# Patient Record
Sex: MALE | Race: WHITE | Employment: OTHER | URBAN - METROPOLITAN AREA
[De-identification: names, ages, dates, MRNs, and addresses within clinical notes are randomized per-mention and may not be internally consistent; named-entity substitution may affect disease eponyms.]

---

## 2022-07-29 ENCOUNTER — TRANSITIONAL CARE UNIT VISIT (OUTPATIENT)
Dept: GERIATRICS | Facility: CLINIC | Age: 87
End: 2022-07-29
Payer: COMMERCIAL

## 2022-07-29 VITALS
RESPIRATION RATE: 18 BRPM | BODY MASS INDEX: 25.67 KG/M2 | OXYGEN SATURATION: 97 % | HEIGHT: 68 IN | HEART RATE: 58 BPM | TEMPERATURE: 97.8 F | WEIGHT: 169.4 LBS | SYSTOLIC BLOOD PRESSURE: 156 MMHG | DIASTOLIC BLOOD PRESSURE: 88 MMHG

## 2022-07-29 DIAGNOSIS — E11.22 TYPE 2 DIABETES MELLITUS WITH STAGE 3B CHRONIC KIDNEY DISEASE, WITHOUT LONG-TERM CURRENT USE OF INSULIN (H): ICD-10-CM

## 2022-07-29 DIAGNOSIS — R33.9 URINE RETENTION: ICD-10-CM

## 2022-07-29 DIAGNOSIS — Z90.79 S/P RADICAL CYSTOPROSTATECTOMY: ICD-10-CM

## 2022-07-29 DIAGNOSIS — Z90.6 S/P RADICAL CYSTOPROSTATECTOMY: ICD-10-CM

## 2022-07-29 DIAGNOSIS — N39.0 URINARY TRACT INFECTION ASSOCIATED WITH CATHETERIZATION OF URINARY TRACT, UNSPECIFIED INDWELLING URINARY CATHETER TYPE, SUBSEQUENT ENCOUNTER: ICD-10-CM

## 2022-07-29 DIAGNOSIS — I10 ESSENTIAL HYPERTENSION: ICD-10-CM

## 2022-07-29 DIAGNOSIS — K52.1 ANTIBIOTIC-ASSOCIATED DIARRHEA: Primary | ICD-10-CM

## 2022-07-29 DIAGNOSIS — T36.95XA ANTIBIOTIC-ASSOCIATED DIARRHEA: Primary | ICD-10-CM

## 2022-07-29 DIAGNOSIS — N18.32 TYPE 2 DIABETES MELLITUS WITH STAGE 3B CHRONIC KIDNEY DISEASE, WITHOUT LONG-TERM CURRENT USE OF INSULIN (H): ICD-10-CM

## 2022-07-29 DIAGNOSIS — T83.511D URINARY TRACT INFECTION ASSOCIATED WITH CATHETERIZATION OF URINARY TRACT, UNSPECIFIED INDWELLING URINARY CATHETER TYPE, SUBSEQUENT ENCOUNTER: ICD-10-CM

## 2022-07-29 DIAGNOSIS — I48.20 CHRONIC ATRIAL FIBRILLATION (H): ICD-10-CM

## 2022-07-29 PROBLEM — I25.2 HISTORY OF NON-ST ELEVATION MYOCARDIAL INFARCTION (NSTEMI): Status: ACTIVE | Noted: 2018-01-22

## 2022-07-29 PROBLEM — Z86.79 HISTORY OF ATRIAL FIBRILLATION: Status: ACTIVE | Noted: 2018-01-22

## 2022-07-29 PROBLEM — Z85.46 HISTORY OF PROSTATE CANCER: Status: ACTIVE | Noted: 2021-07-27

## 2022-07-29 PROBLEM — T83.511A URINARY TRACT INFECTION ASSOCIATED WITH CATHETERIZATION OF URINARY TRACT (H): Status: ACTIVE | Noted: 2022-07-19

## 2022-07-29 PROBLEM — R19.7 ACUTE DIARRHEA: Status: ACTIVE | Noted: 2022-07-24

## 2022-07-29 PROCEDURE — 99309 SBSQ NF CARE MODERATE MDM 30: CPT | Performed by: NURSE PRACTITIONER

## 2022-07-29 RX ORDER — AMLODIPINE BESYLATE 2.5 MG/1
2.5 TABLET ORAL DAILY
COMMUNITY

## 2022-07-29 RX ORDER — LEVOFLOXACIN 750 MG/1
750 TABLET, FILM COATED ORAL
COMMUNITY
End: 2022-08-05

## 2022-07-29 RX ORDER — ACETAMINOPHEN 500 MG
1000 TABLET ORAL 3 TIMES DAILY PRN
COMMUNITY

## 2022-07-29 RX ORDER — ATORVASTATIN CALCIUM 40 MG/1
40 TABLET, FILM COATED ORAL DAILY
COMMUNITY

## 2022-07-29 RX ORDER — ASPIRIN 81 MG/1
81 TABLET ORAL DAILY
COMMUNITY

## 2022-07-29 RX ORDER — METOPROLOL TARTRATE 50 MG
50 TABLET ORAL 2 TIMES DAILY
COMMUNITY

## 2022-07-29 NOTE — LETTER
7/29/2022        RE: Reuben Dowell  [address]  [facility] [address]        [facility] GERIATRICS    PRIMARY CARE PROVIDER AND CLINIC:  [facility], [address] [place] / [place] [address]  Chief Complaint   Patient presents with     Hospital F/U      [place] Medical Record Number:  [id number]  Place of Service where encounter took place:  [facility] AT [facility] ([facility]) [[id number]]    Reuben Dowell  is a 87 year old  (1935), admitted to the above facility from  [facility]. Hospital stay 7/24/22 through 7/27/22..   HPI:    Reuben Dowell is a 87 y.o. male with a PMH significant for prostate cancer s/p radical prostatectomy, urinary retention, chronic indwelling Foster catheter, type 2 diabetes, HTN CKD, Hx C. Difficile, admission at [facility] 7/19 - 7/22 for sepsis secondary to Enterococcus catheter associated UTI ( 14 d course of levaquin) and possible pneumonia, now presents with acute diarrhea WBC 13.6, negative Cdiff, UC enterococcus faecalis and continued levofloxacin 750mg Q48h x6 more doses, continue foster and change Q3 weeks, AbdCT no stones nor hydronephrosis, discharge to TCU.    Patient resides in home with spouse, lethargic, limited historian, urine output medium clear yellow in collection device, A1C was 7.3, creat 1.52, appears healthy, deconditioned, therapies to start today.      CODE STATUS/ADVANCE DIRECTIVES DISCUSSION:  No Order  CPR/Full code   ALLERGIES:   Allergies   Allergen Reactions     Codeine Sulfate [Codeine]      Hydrocodone-Acetaminophen       PAST MEDICAL HISTORY: No past medical history on file.   PAST SURGICAL HISTORY:   has no past surgical history on file.  FAMILY HISTORY: family history is not on file.  SOCIAL HISTORY:     Patient's living condition: lives with spouse    Post Discharge Medication Reconciliation Status: discharge medications reconciled, continue medications without change  Current  "Outpatient Medications   Medication Sig     acetaminophen (TYLENOL) 500 MG tablet Take 1,000 mg by mouth 3 times daily     amLODIPine (NORVASC) 2.5 MG tablet Take 2.5 mg by mouth daily     aspirin 81 MG EC tablet Take 81 mg by mouth daily     atorvastatin (LIPITOR) 40 MG tablet Take 40 mg by mouth daily     calcium carbonate 600 mg-vitamin D 400 units (CALTRATE) 600-400 MG-UNIT per tablet Take 1 tablet by mouth 2 times daily     levofloxacin (LEVAQUIN) 750 MG tablet Take 750 mg by mouth every 3 days     metoprolol tartrate (LOPRESSOR) 50 MG tablet Take 50 mg by mouth 2 times daily     No current facility-administered medications for this visit.       ROS:  4 point ROS including Respiratory, CV, GI and , other than that noted in the HPI,  is negative    Vitals:  BP (!) 156/88   Pulse 58   Temp 97.8  F (36.6  C)   Resp 18   Ht 1.727 m (5' 8\")   Wt 76.8 kg (169 lb 6.4 oz)   SpO2 97%   BMI 25.76 kg/m    Exam:  GENERAL APPEARANCE:  in no distress, appears healthy  ENT:  Mouth and posterior oropharynx normal, moist mucous membranes  RESP:  lungs clear to auscultation   CV:  no edema, rate-normal  ABDOMEN:  bowel sounds normal  M/S:   Gait and station abnormal unsteady gait  SKIN:  Inspection of skin and subcutaneous tissue baseline  NEURO:   Examination of sensation by touch normal  PSYCH:  oriented X 3    Lab/Diagnostic data:  Recent labs in [facility] reviewed by me today.     ASSESSMENT/PLAN:    (K52.1,  T36.95XA) Antibiotic-associated diarrhea  (primary encounter diagnosis)  Comment: post Cdiff, recent test negative  Plan:   -PT/OT eval and treat for deconditioning  -CBC, BMP, A1C on 8/1  -UA/UC on 8/1  -Cdiff test 8/1  -staff to monitor output    (Z90.79,  Z90.6) S/P radical cystoprostatectomy  (R33.9) Urine retention  (T83.511D,  N39.0) Urinary tract infection associated with catheterization of urinary tract, unspecified indwelling urinary catheter type, subsequent encounter  Comment: post TURP, urine retention, " UTI+  Plan: continue levofloxacin 750mg every other day through 7/31 then discontinue  -change foster Q3 weeks  -consider trial void  -f/u  urology in 2 weeks    (E11.22,  N18.32) Type 2 diabetes mellitus with stage 3b chronic kidney disease, without long-term current use of insulin (H)  Comment: recent A1C 7.3  Plan: check BG's as scheduled  -continue amaryl 4mg  -A1C on 8/1    (I48.20) Chronic atrial fibrillation (H)  Comment: today RRR  Plan: continue metoprolol 50mg BID, asa 81, lipitor 40  -follow up cardiology PRN    (I10) Essential hypertension  Comment: SBP's in the 130-150 range  Plan: staff to check VS daily  -continue metoprolol 50mg BID, amlodipine 2.5mg daily  -adjust med regimen accordingly        Electronically signed by:  VIDHYA Hayes CNP                     Sincerely,        VIDHYA Hayes CNP

## 2022-07-29 NOTE — PROGRESS NOTES
[facility] GERIATRICS    PRIMARY CARE PROVIDER AND CLINIC:  [facility], [address] [place] / [place] [address]  Chief Complaint   Patient presents with     Hospital F/U      [place] Medical Record Number:  [id number]  Place of Service where encounter took place:  [facility]  AT [facility] ([facility]) [[id number]]    Reuben Dowell  is a 87 year old  (1935), admitted to the above facility from  [facility]. Hospital stay 7/24/22 through 7/27/22..   HPI:    Reuben Dowell is a 87 y.o. male with a PMH significant for prostate cancer s/p radical prostatectomy, urinary retention, chronic indwelling Foster catheter, type 2 diabetes, HTN CKD, Hx C. Difficile, admission at [facility] 7/19 - 7/22 for sepsis secondary to Enterococcus catheter associated UTI ( 14 d course of levaquin) and possible pneumonia, now presents with acute diarrhea WBC 13.6, negative Cdiff, UC enterococcus faecalis and continued levofloxacin 750mg Q48h x6 more doses, continue foster and change Q3 weeks, AbdCT no stones nor hydronephrosis, discharge to TCU.    Patient resides in home with spouse, lethargic, limited historian, urine output medium clear yellow in collection device, A1C was 7.3, creat 1.52, appears healthy, deconditioned, therapies to start today.      CODE STATUS/ADVANCE DIRECTIVES DISCUSSION:  No Order  CPR/Full code   ALLERGIES:   Allergies   Allergen Reactions     Codeine Sulfate [Codeine]      Hydrocodone-Acetaminophen       PAST MEDICAL HISTORY: No past medical history on file.   PAST SURGICAL HISTORY:   has no past surgical history on file.  FAMILY HISTORY: family history is not on file.  SOCIAL HISTORY:     Patient's living condition: lives with spouse    Post Discharge Medication Reconciliation Status: discharge medications reconciled, continue medications without change  Current Outpatient Medications   Medication Sig     acetaminophen (TYLENOL) 500 MG tablet Take  "1,000 mg by mouth 3 times daily     amLODIPine (NORVASC) 2.5 MG tablet Take 2.5 mg by mouth daily     aspirin 81 MG EC tablet Take 81 mg by mouth daily     atorvastatin (LIPITOR) 40 MG tablet Take 40 mg by mouth daily     calcium carbonate 600 mg-vitamin D 400 units (CALTRATE) 600-400 MG-UNIT per tablet Take 1 tablet by mouth 2 times daily     levofloxacin (LEVAQUIN) 750 MG tablet Take 750 mg by mouth every 3 days     metoprolol tartrate (LOPRESSOR) 50 MG tablet Take 50 mg by mouth 2 times daily     No current facility-administered medications for this visit.       ROS:  4 point ROS including Respiratory, CV, GI and , other than that noted in the HPI,  is negative    Vitals:  BP (!) 156/88   Pulse 58   Temp 97.8  F (36.6  C)   Resp 18   Ht 1.727 m (5' 8\")   Wt 76.8 kg (169 lb 6.4 oz)   SpO2 97%   BMI 25.76 kg/m    Exam:  GENERAL APPEARANCE:  in no distress, appears healthy  ENT:  Mouth and posterior oropharynx normal, moist mucous membranes  RESP:  lungs clear to auscultation   CV:  no edema, rate-normal  ABDOMEN:  bowel sounds normal  M/S:   Gait and station abnormal unsteady gait  SKIN:  Inspection of skin and subcutaneous tissue baseline  NEURO:   Examination of sensation by touch normal  PSYCH:  oriented X 3    Lab/Diagnostic data:  Recent labs in [facility] reviewed by me today.     ASSESSMENT/PLAN:    (K52.1,  T36.95XA) Antibiotic-associated diarrhea  (primary encounter diagnosis)  Comment: post Cdiff, recent test negative  Plan:   -PT/OT eval and treat for deconditioning  -CBC, BMP, A1C on 8/1  -UA/UC on 8/1  -Cdiff test 8/1  -staff to monitor output    (Z90.79,  Z90.6) S/P radical cystoprostatectomy  (R33.9) Urine retention  (T83.511D,  N39.0) Urinary tract infection associated with catheterization of urinary tract, unspecified indwelling urinary catheter type, subsequent encounter  Comment: post TURP, urine retention, UTI+  Plan: continue levofloxacin 750mg every other day through 7/31 then " discontinue  -change foster Q3 weeks  -consider trial void  -f/u  urology in 2 weeks    (E11.22,  N18.32) Type 2 diabetes mellitus with stage 3b chronic kidney disease, without long-term current use of insulin (H)  Comment: recent A1C 7.3  Plan: check BG's as scheduled  -continue amaryl 4mg  -A1C on 8/1    (I48.20) Chronic atrial fibrillation (H)  Comment: today RRR  Plan: continue metoprolol 50mg BID, asa 81, lipitor 40  -follow up cardiology PRN    (I10) Essential hypertension  Comment: SBP's in the 130-150 range  Plan: staff to check VS daily  -continue metoprolol 50mg BID, amlodipine 2.5mg daily  -adjust med regimen accordingly        Electronically signed by:  VIDHYA Hayes CNP

## 2022-08-01 ENCOUNTER — LAB REQUISITION (OUTPATIENT)
Dept: LAB | Facility: CLINIC | Age: 87
End: 2022-08-01

## 2022-08-01 DIAGNOSIS — E11.9 TYPE 2 DIABETES MELLITUS WITHOUT COMPLICATIONS (H): ICD-10-CM

## 2022-08-01 DIAGNOSIS — R30.0 DYSURIA: ICD-10-CM

## 2022-08-01 LAB
ALBUMIN UR-MCNC: 70 MG/DL
ANION GAP SERPL CALCULATED.3IONS-SCNC: 15 MMOL/L (ref 7–15)
APPEARANCE UR: ABNORMAL
BACTERIA #/AREA URNS HPF: ABNORMAL /HPF
BILIRUB UR QL STRIP: NEGATIVE
BUN SERPL-MCNC: 22.7 MG/DL (ref 8–23)
CALCIUM SERPL-MCNC: 8.7 MG/DL (ref 8.8–10.2)
CHLORIDE SERPL-SCNC: 107 MMOL/L (ref 98–107)
COLOR UR AUTO: YELLOW
CREAT SERPL-MCNC: 1.5 MG/DL (ref 0.67–1.17)
DEPRECATED HCO3 PLAS-SCNC: 17 MMOL/L (ref 22–29)
ERYTHROCYTE [DISTWIDTH] IN BLOOD BY AUTOMATED COUNT: 15.3 % (ref 10–15)
GFR SERPL CREATININE-BSD FRML MDRD: 45 ML/MIN/1.73M2
GLUCOSE SERPL-MCNC: 180 MG/DL (ref 70–99)
GLUCOSE UR STRIP-MCNC: 50 MG/DL
HBA1C MFR BLD: 6.9 %
HCT VFR BLD AUTO: 38 % (ref 40–53)
HGB BLD-MCNC: 12.6 G/DL (ref 13.3–17.7)
HGB UR QL STRIP: ABNORMAL
KETONES UR STRIP-MCNC: NEGATIVE MG/DL
LEUKOCYTE ESTERASE UR QL STRIP: ABNORMAL
MCH RBC QN AUTO: 29.1 PG (ref 26.5–33)
MCHC RBC AUTO-ENTMCNC: 33.2 G/DL (ref 31.5–36.5)
MCV RBC AUTO: 88 FL (ref 78–100)
NITRATE UR QL: NEGATIVE
PH UR STRIP: 5.5 [PH] (ref 5–7)
PLATELET # BLD AUTO: 164 10E3/UL (ref 150–450)
POTASSIUM SERPL-SCNC: 3.4 MMOL/L (ref 3.4–5.3)
RBC # BLD AUTO: 4.33 10E6/UL (ref 4.4–5.9)
RBC URINE: >182 /HPF
SODIUM SERPL-SCNC: 139 MMOL/L (ref 136–145)
SP GR UR STRIP: 1.02 (ref 1–1.03)
UROBILINOGEN UR STRIP-MCNC: NORMAL MG/DL
WBC # BLD AUTO: 8.4 10E3/UL (ref 4–11)
WBC URINE: 60 /HPF
YEAST #/AREA URNS HPF: ABNORMAL /HPF

## 2022-08-01 PROCEDURE — 80048 BASIC METABOLIC PNL TOTAL CA: CPT | Performed by: NURSE PRACTITIONER

## 2022-08-01 PROCEDURE — 83036 HEMOGLOBIN GLYCOSYLATED A1C: CPT | Performed by: NURSE PRACTITIONER

## 2022-08-01 PROCEDURE — 81001 URINALYSIS AUTO W/SCOPE: CPT | Performed by: NURSE PRACTITIONER

## 2022-08-01 PROCEDURE — 87086 URINE CULTURE/COLONY COUNT: CPT | Performed by: NURSE PRACTITIONER

## 2022-08-01 PROCEDURE — 85027 COMPLETE CBC AUTOMATED: CPT | Performed by: NURSE PRACTITIONER

## 2022-08-02 ENCOUNTER — TRANSITIONAL CARE UNIT VISIT (OUTPATIENT)
Dept: GERIATRICS | Facility: CLINIC | Age: 87
End: 2022-08-02
Payer: COMMERCIAL

## 2022-08-02 VITALS
RESPIRATION RATE: 17 BRPM | WEIGHT: 162.8 LBS | SYSTOLIC BLOOD PRESSURE: 152 MMHG | DIASTOLIC BLOOD PRESSURE: 85 MMHG | BODY MASS INDEX: 24.67 KG/M2 | HEIGHT: 68 IN | HEART RATE: 85 BPM | OXYGEN SATURATION: 97 % | TEMPERATURE: 97.5 F

## 2022-08-02 DIAGNOSIS — Z90.79 S/P RADICAL CYSTOPROSTATECTOMY: ICD-10-CM

## 2022-08-02 DIAGNOSIS — Z90.6 S/P RADICAL CYSTOPROSTATECTOMY: ICD-10-CM

## 2022-08-02 DIAGNOSIS — N39.0 URINARY TRACT INFECTION ASSOCIATED WITH CATHETERIZATION OF URINARY TRACT, UNSPECIFIED INDWELLING URINARY CATHETER TYPE, SUBSEQUENT ENCOUNTER: Primary | ICD-10-CM

## 2022-08-02 DIAGNOSIS — T83.511D URINARY TRACT INFECTION ASSOCIATED WITH CATHETERIZATION OF URINARY TRACT, UNSPECIFIED INDWELLING URINARY CATHETER TYPE, SUBSEQUENT ENCOUNTER: Primary | ICD-10-CM

## 2022-08-02 DIAGNOSIS — Z71.89 ACP (ADVANCE CARE PLANNING): ICD-10-CM

## 2022-08-02 DIAGNOSIS — R19.7 DIARRHEA, UNSPECIFIED TYPE: ICD-10-CM

## 2022-08-02 PROCEDURE — 99497 ADVNCD CARE PLAN 30 MIN: CPT | Performed by: NURSE PRACTITIONER

## 2022-08-02 PROCEDURE — 99309 SBSQ NF CARE MODERATE MDM 30: CPT | Performed by: NURSE PRACTITIONER

## 2022-08-02 NOTE — PROGRESS NOTES
[facility] GERIATRICS    Chief Complaint   Patient presents with     RECHECK     HPI:  Reuben Dowell is a 87 year old  (1935), who is being seen today for an episodic care visit at: [facility] AT [facility] ([facility]) [[id number]]. Today's concern is:   1. Urinary tract infection associated with catheterization of urinary tract, unspecified indwelling urinary catheter type, subsequent encounter    2. S/P radical cystoprostatectomy    3. Diarrhea, unspecified type    4. ACP (advance care planning)      Patient post prostatectomy, urine retention, treated in hospital for UTI, now having diarrhea for past few days and not resolving, pending Cdiff test, labs on 8/1 Hgb 12.6, WBC 8.4, GFR 45, overall appears deconditioned, lethargic, participating in therapies, completed POLST full code.    Advance Care Planning Goals of Care Discussion 8/2/2022  Goals of care discussed with Reuben Dowell on 8/2. Present at discussion: patient. Questions discussed and patient response:  What is your understanding now of where you are with your illness?: pretty good. How much information about what is likely to be ahead with your illness would you like to have?: all of it. As the clinician I communicated the following to the patient regarding their prognosis: good. If your health situation worsens, what are your most important goals?: live life. What are your biggest fears and worries about the future with your health?: none. Unacceptable function : unsure. What abilities are so critical to your life that you cannot imagine living without them?: going home. Pt does NOT want to: die. If you become sicker, how much are you willing to go through for the possibility of gaining more time?: probably. Would this change if these were permanent states, if they did not get better?: maybe. How much does your agent and/or family know about your priorities and wishes?: thinks everything. Added by VIDHYA Hayes  "CNP      Allergies, and PMH/PSH reviewed in EPIC today.  REVIEW OF SYSTEMS:  4 point ROS including Respiratory, CV, GI and , other than that noted in the HPI,  is negative    Objective:   BP (!) 152/85   Pulse 85   Temp 97.5  F (36.4  C)   Resp 17   Ht 1.727 m (5' 8\")   Wt 73.8 kg (162 lb 12.8 oz)   SpO2 97%   BMI 24.75 kg/m    GENERAL APPEARANCE:  in no distress, appears healthy  ENT:  Mouth and posterior oropharynx normal, moist mucous membranes  RESP:  lungs clear to auscultation   CV:  no edema, rate-normal  ABDOMEN:  bowel sounds normal  M/S:   Gait and station abnormal unstable gait  SKIN:  Inspection of skin and subcutaneous tissue baseline  NEURO:   Examination of sensation by touch normal  PSYCH:  oriented X 3    Labs done in SNF are in [facility]. Please refer to them using [employer]/Care Everywhere.    Assessment/Plan:  (T83.511D,  N39.0) Urinary tract infection associated with catheterization of urinary tract, unspecified indwelling urinary catheter type, subsequent encounter  (primary diagnosis)  (Z90.79,  Z90.6) S/P radical cystoprostatectomy  Comment: afebrile, urine output WNL, UA neg nitrites, many bacteria  Plan: pending UC results  -of note already completed course of levofloxacin on 7/31  -push fluids as tolerates    (R19.7) Diarrhea, unspecified type  Comment: over past few days, not resolving  Plan: pending Cdiff test  -maintain on precautions  -not initiating imodium until test returns    (Z71.89) ACP (advance care planning)  Comment: completed POLST; full code  Plan: ACP z71.89  -I spent 16 minutes in addition to todays visit completing a POLST.      Electronically signed by: VIDHYA Hayes CNP       "

## 2022-08-02 NOTE — LETTER
8/2/2022        RE: Reuben Dowell  [address]  [facility] [address]        [facility] GERIATRICS    Chief Complaint   Patient presents with     RECHECK     HPI:  Reuben Dowell is a 87 year old  (1935), who is being seen today for an episodic care visit at: [facility] AT [facility] [[id number]]. Today's concern is:   1. Urinary tract infection associated with catheterization of urinary tract, unspecified indwelling urinary catheter type, subsequent encounter    2. S/P radical cystoprostatectomy    3. Diarrhea, unspecified type    4. ACP (advance care planning)      Patient post prostatectomy, urine retention, treated in hospital for UTI, now having diarrhea for past few days and not resolving, pending Cdiff test, labs on 8/1 Hgb 12.6, WBC 8.4, GFR 45, overall appears deconditioned, lethargic, participating in therapies, completed POLST full code.    Advance Care Planning Goals of Care Discussion 8/2/2022  Goals of care discussed with Reuben Dowell on 8/2. Present at discussion: patient. Questions discussed and patient response:  What is your understanding now of where you are with your illness?: pretty good. How much information about what is likely to be ahead with your illness would you like to have?: all of it. As the clinician I communicated the following to the patient regarding their prognosis: good. If your health situation worsens, what are your most important goals?: live life. What are your biggest fears and worries about the future with your health?: none. Unacceptable function : unsure. What abilities are so critical to your life that you cannot imagine living without them?: going home. Pt does NOT want to: die. If you become sicker, how much are you willing to go through for the possibility of gaining more time?: probably. Would this change if these were permanent states, if they did not get better?: maybe. How much does your agent and/or family know about your priorities  "and wishes?: thinks everything. Added by VIDHYA Hayes CNP      Allergies, and PMH/PSH reviewed in EPIC today.  REVIEW OF SYSTEMS:  4 point ROS including Respiratory, CV, GI and , other than that noted in the HPI,  is negative    Objective:   BP (!) 152/85   Pulse 85   Temp 97.5  F (36.4  C)   Resp 17   Ht 1.727 m (5' 8\")   Wt 73.8 kg (162 lb 12.8 oz)   SpO2 97%   BMI 24.75 kg/m    GENERAL APPEARANCE:  in no distress, appears healthy  ENT:  Mouth and posterior oropharynx normal, moist mucous membranes  RESP:  lungs clear to auscultation   CV:  no edema, rate-normal  ABDOMEN:  bowel sounds normal  M/S:   Gait and station abnormal unstable gait  SKIN:  Inspection of skin and subcutaneous tissue baseline  NEURO:   Examination of sensation by touch normal  PSYCH:  oriented X 3    Labs done in SNF are in [facility]. Please refer to them using [employer]/Care Everywhere.    Assessment/Plan:  (T83.511D,  N39.0) Urinary tract infection associated with catheterization of urinary tract, unspecified indwelling urinary catheter type, subsequent encounter  (primary diagnosis)  (Z90.79,  Z90.6) S/P radical cystoprostatectomy  Comment: afebrile, urine output WNL, UA neg nitrites, many bacteria  Plan: pending UC results  -of note already completed course of levofloxacin on 7/31  -push fluids as tolerates    (R19.7) Diarrhea, unspecified type  Comment: over past few days, not resolving  Plan: pending Cdiff test  -maintain on precautions  -not initiating imodium until test returns    (Z71.89) ACP (advance care planning)  Comment: completed POLST; full code  Plan: ACP z71.89  -I spent 16 minutes in addition to todays visit completing a POLST.      Electronically signed by: VIDHYA Hayes CNP             Sincerely,        VIDHYA Hayes CNP      "

## 2022-08-04 ENCOUNTER — LAB REQUISITION (OUTPATIENT)
Dept: LAB | Facility: CLINIC | Age: 87
End: 2022-08-04

## 2022-08-04 DIAGNOSIS — R19.7 DIARRHEA, UNSPECIFIED: ICD-10-CM

## 2022-08-04 LAB
BACTERIA UR CULT: ABNORMAL
BACTERIA UR CULT: ABNORMAL

## 2022-08-04 PROCEDURE — 87493 C DIFF AMPLIFIED PROBE: CPT | Performed by: NURSE PRACTITIONER

## 2022-08-05 ENCOUNTER — TRANSITIONAL CARE UNIT VISIT (OUTPATIENT)
Dept: GERIATRICS | Facility: CLINIC | Age: 87
End: 2022-08-05
Payer: COMMERCIAL

## 2022-08-05 VITALS
WEIGHT: 162.8 LBS | HEIGHT: 68 IN | BODY MASS INDEX: 24.67 KG/M2 | TEMPERATURE: 97.7 F | SYSTOLIC BLOOD PRESSURE: 152 MMHG | HEART RATE: 61 BPM | RESPIRATION RATE: 18 BRPM | OXYGEN SATURATION: 98 % | DIASTOLIC BLOOD PRESSURE: 81 MMHG

## 2022-08-05 DIAGNOSIS — K52.1 ANTIBIOTIC-ASSOCIATED DIARRHEA: Primary | ICD-10-CM

## 2022-08-05 DIAGNOSIS — T83.510D URINARY TRACT INFECTION ASSOCIATED WITH CYSTOSTOMY CATHETER, SUBSEQUENT ENCOUNTER: ICD-10-CM

## 2022-08-05 DIAGNOSIS — A04.72 COLITIS DUE TO CLOSTRIDIUM DIFFICILE: ICD-10-CM

## 2022-08-05 DIAGNOSIS — R33.9 URINE RETENTION: ICD-10-CM

## 2022-08-05 DIAGNOSIS — T36.95XA ANTIBIOTIC-ASSOCIATED DIARRHEA: Primary | ICD-10-CM

## 2022-08-05 DIAGNOSIS — N39.0 URINARY TRACT INFECTION ASSOCIATED WITH CYSTOSTOMY CATHETER, SUBSEQUENT ENCOUNTER: ICD-10-CM

## 2022-08-05 LAB — C DIFF TOX B STL QL: POSITIVE

## 2022-08-05 PROCEDURE — 99309 SBSQ NF CARE MODERATE MDM 30: CPT | Performed by: NURSE PRACTITIONER

## 2022-08-05 RX ORDER — VANCOMYCIN HYDROCHLORIDE 125 MG/1
125 CAPSULE ORAL 4 TIMES DAILY
Qty: 40 CAPSULE | Refills: 0
Start: 2022-08-05 | End: 2022-08-15

## 2022-08-05 NOTE — LETTER
"    8/5/2022        RE: Reuben Dowell  [address]  [facility] [address]        [facility] GERIATRICS    Chief Complaint   Patient presents with     RECHECK     HPI:  Reuben Dowell is a 87 year old  (1935), who is being seen today for an episodic care visit at: [facility] AT [facility] ([facility]) [[id number]]. Today's concern is:   1. Antibiotic-associated diarrhea    2. Colitis due to Clostridium difficile    3. Urine retention    4. Urinary tract infection associated with cystostomy catheter, subsequent encounter      Patient post hospitalization for UTI and ABX associated diarrhea,post TURP, recent creat 1.5, GFR 45, urine now clear medium yellow, foster patent, UA/UC on 8/3 with >100k candida plus saccharomyces, asymptomatic and not treated, Cdiff test + on 8/4, continues to have mucus/odiferous diarrhea, appears healthy, mild cognitive limitations, no distress.    Allergies, and PMH/PSH reviewed in EPIC today.  REVIEW OF SYSTEMS:  4 point ROS including Respiratory, CV, GI and , other than that noted in the HPI,  is negative    Objective:   BP (!) 152/81   Pulse 61   Temp 97.7  F (36.5  C)   Resp 18   Ht 1.727 m (5' 8\")   Wt 73.8 kg (162 lb 12.8 oz)   SpO2 98%   BMI 24.75 kg/m    GENERAL APPEARANCE:  in no distress, appears healthy  ENT:  Mouth and posterior oropharynx normal, moist mucous membranes  RESP:  lungs clear to auscultation   CV:  no edema  ABDOMEN:  bowel sounds normal  M/S:   Gait and station abnormal unsteady gait  SKIN:  Inspection of skin and subcutaneous tissue baseline  NEURO:   Examination of sensation by touch normal  PSYCH:  oriented X 3    Labs done in SNF are in [facility]. Please refer to them using [facility]/Care Everywhere.    Assessment/Plan:  (K52.1,  T36.95XA) Antibiotic-associated diarrhea  (primary encounter diagnosis)  (A04.72) Colitis due to Clostridium difficile  Comment: probably 2/2 hospital stay and levofloxacin for UTI, loose odiferous stool  Plan: "   -therapies to continue  -vancomycin 125mg QID x10 days  -plan to recheck post-treatment  -on precautions    (R33.9) Urine retention  (T83.510D,  N39.0) Urinary tract infection associated with cystostomy catheter, subsequent encounter  Comment: UTI in hospital JD [facility] levofloxacin, UC on 8/3 with >100k candida and saccharomyces  Plan:   -no Tx indicated, urine output WNL, afebrile  -staff to monitor urine  -lifetime foster; change as indicated        Electronically signed by: VIDHYA Hayes CNP             Sincerely,        VIDHYA Hayes CNP

## 2022-08-05 NOTE — PROGRESS NOTES
"[facility] GERIATRICS    Chief Complaint   Patient presents with     RECHECK     HPI:  Reuben Dowell is a 87 year old  (1935), who is being seen today for an episodic care visit at: [facility] AT [facility] ([facility]) [[id number]]. Today's concern is:   1. Antibiotic-associated diarrhea    2. Colitis due to Clostridium difficile    3. Urine retention    4. Urinary tract infection associated with cystostomy catheter, subsequent encounter      Patient post hospitalization for UTI and ABX associated diarrhea,post TURP, recent creat 1.5, GFR 45, urine now clear medium yellow, foster patent, UA/UC on 8/3 with >100k candida plus saccharomyces, asymptomatic and not treated, Cdiff test + on 8/4, continues to have mucus/odiferous diarrhea, appears healthy, mild cognitive limitations, no distress.    Allergies, and PMH/PSH reviewed in EPIC today.  REVIEW OF SYSTEMS:  4 point ROS including Respiratory, CV, GI and , other than that noted in the HPI,  is negative    Objective:   BP (!) 152/81   Pulse 61   Temp 97.7  F (36.5  C)   Resp 18   Ht 1.727 m (5' 8\")   Wt 73.8 kg (162 lb 12.8 oz)   SpO2 98%   BMI 24.75 kg/m    GENERAL APPEARANCE:  in no distress, appears healthy  ENT:  Mouth and posterior oropharynx normal, moist mucous membranes  RESP:  lungs clear to auscultation   CV:  no edema  ABDOMEN:  bowel sounds normal  M/S:   Gait and station abnormal unsteady gait  SKIN:  Inspection of skin and subcutaneous tissue baseline  NEURO:   Examination of sensation by touch normal  PSYCH:  oriented X 3    Labs done in SNF are in [facility]. Please refer to them using [employer]/Care Everywhere.    Assessment/Plan:  (K52.1,  T36.95XA) Antibiotic-associated diarrhea  (primary encounter diagnosis)  (A04.72) Colitis due to Clostridium difficile  Comment: probably 2/2 hospital stay and levofloxacin for UTI, loose odiferous stool  Plan:   -therapies to continue  -vancomycin 125mg QID x10 days  -plan to recheck post-treatment  -on " precautions    (R33.9) Urine retention  (T83.510D,  N39.0) Urinary tract infection associated with cystostomy catheter, subsequent encounter  Comment: UTI in hospital Maria A, T levofloxacin, UC on 8/3 with >100k candida and saccharomyces  Plan:   -no Tx indicated, urine output WNL, afebrile  -staff to monitor urine  -lifetime foster; change as indicated        Electronically signed by: VIDHYA Hayes CNP

## 2022-08-09 ENCOUNTER — TRANSITIONAL CARE UNIT VISIT (OUTPATIENT)
Dept: GERIATRICS | Facility: CLINIC | Age: 87
End: 2022-08-09
Payer: COMMERCIAL

## 2022-08-09 VITALS
OXYGEN SATURATION: 100 % | DIASTOLIC BLOOD PRESSURE: 101 MMHG | RESPIRATION RATE: 18 BRPM | SYSTOLIC BLOOD PRESSURE: 173 MMHG | HEART RATE: 58 BPM | BODY MASS INDEX: 25.07 KG/M2 | TEMPERATURE: 97.7 F | HEIGHT: 68 IN | WEIGHT: 165.4 LBS

## 2022-08-09 DIAGNOSIS — E11.22 TYPE 2 DIABETES MELLITUS WITH STAGE 3B CHRONIC KIDNEY DISEASE, WITHOUT LONG-TERM CURRENT USE OF INSULIN (H): ICD-10-CM

## 2022-08-09 DIAGNOSIS — I48.20 CHRONIC ATRIAL FIBRILLATION (H): Primary | ICD-10-CM

## 2022-08-09 DIAGNOSIS — A04.72 COLITIS DUE TO CLOSTRIDIUM DIFFICILE: ICD-10-CM

## 2022-08-09 DIAGNOSIS — N18.32 TYPE 2 DIABETES MELLITUS WITH STAGE 3B CHRONIC KIDNEY DISEASE, WITHOUT LONG-TERM CURRENT USE OF INSULIN (H): ICD-10-CM

## 2022-08-09 PROCEDURE — 99309 SBSQ NF CARE MODERATE MDM 30: CPT | Performed by: NURSE PRACTITIONER

## 2022-08-09 NOTE — PROGRESS NOTES
"[facility] GERIATRICS    Chief Complaint   Patient presents with     RECHECK     HPI:  Reuben Dowell is a 87 year old  (1935), who is being seen today for an episodic care visit at: [facility] AT [facility] ([facility]) [[id number]]. Today's concern is:   1. Chronic atrial fibrillation (H)    2. Type 2 diabetes mellitus with stage 3b chronic kidney disease, without long-term current use of insulin (H)    3. Colitis due to Clostridium difficile      Patient seen for follow up, lying in bed, mild cognitive limitations, RRR and denies CP, A1C on 8/1 was 6.9, amaryl was DC'd in hospital, Cdiff+ on 8/4, reports diarrhea is improving, room does smell better today, overall appears healthy, participating in therapies, wants to return home.    Allergies, and PMH/PSH reviewed in EPIC today.  REVIEW OF SYSTEMS:  4 point ROS including Respiratory, CV, GI and , other than that noted in the HPI,  is negative    Objective:   BP (!) 173/101   Pulse 58   Temp 97.7  F (36.5  C)   Resp 18   Ht 1.727 m (5' 8\")   Wt 75 kg (165 lb 6.4 oz)   SpO2 100%   BMI 25.15 kg/m    GENERAL APPEARANCE:  in no distress, appears healthy  ENT:  Mouth and posterior oropharynx normal, moist mucous membranes  RESP:  lungs clear to auscultation   CV:  no edema  ABDOMEN:  bowel sounds normal  M/S:   Gait and station abnormal unsteady gait  SKIN:  Inspection of skin and subcutaneous tissue baseline  NEURO:   Examination of sensation by touch normal  PSYCH:  oriented X 3    Labs done in SNF are in [facility]. Please refer to them using [employer]/Care Everywhere.    Assessment/Plan:  (I48.20) Chronic atrial fibrillation (H)  (primary encounter diagnosis)  Comment: denies CP, RRR today  Plan: continue statin and ASA  -follow up cardiology PRN    (E11.22,  N18.32) Type 2 diabetes mellitus with stage 3b chronic kidney disease, without long-term current use of insulin (H)  Comment: A1C was 6.9  Plan: continue without amaryl for now  -repeat A1C in 1-2 " months, if >7 then restart amaryl    (A04.72) Colitis due to Clostridium difficile  Comment: Dx on 8/4  Plan: continue vanco QID through 8/15  -ordered Cdiff recheck on 8/16      Electronically signed by: VIDHYA Hayes CNP

## 2022-08-09 NOTE — LETTER
"    8/9/2022        RE: Reuben Dowell  [address]  [facility] [address]        [facility] GERIATRICS    Chief Complaint   Patient presents with     RECHECK     HPI:  Reuben Dowell is a 87 year old  (1935), who is being seen today for an episodic care visit at: [facility] AT [facility] ([facility]) [[id number]]. Today's concern is:   1. Chronic atrial fibrillation (H)    2. Type 2 diabetes mellitus with stage 3b chronic kidney disease, without long-term current use of insulin (H)    3. Colitis due to Clostridium difficile      Patient seen for follow up, lying in bed, mild cognitive limitations, RRR and denies CP, A1C on 8/1 was 6.9, amaryl was DC'd in hospital, Cdiff+ on 8/4, reports diarrhea is improving, room does smell better today, overall appears healthy, participating in therapies, wants to return home.    Allergies, and PMH/PSH reviewed in EPIC today.  REVIEW OF SYSTEMS:  4 point ROS including Respiratory, CV, GI and , other than that noted in the HPI,  is negative    Objective:   BP (!) 173/101   Pulse 58   Temp 97.7  F (36.5  C)   Resp 18   Ht 1.727 m (5' 8\")   Wt 75 kg (165 lb 6.4 oz)   SpO2 100%   BMI 25.15 kg/m    GENERAL APPEARANCE:  in no distress, appears healthy  ENT:  Mouth and posterior oropharynx normal, moist mucous membranes  RESP:  lungs clear to auscultation   CV:  no edema  ABDOMEN:  bowel sounds normal  M/S:   Gait and station abnormal unsteady gait  SKIN:  Inspection of skin and subcutaneous tissue baseline  NEURO:   Examination of sensation by touch normal  PSYCH:  oriented X 3    Labs done in SNF are in [facility]. Please refer to them using [employer]/Care Everywhere.    Assessment/Plan:  (I48.20) Chronic atrial fibrillation (H)  (primary encounter diagnosis)  Comment: denies CP, RRR today  Plan: continue statin and ASA  -follow up cardiology PRN    (E11.22,  N18.32) Type 2 diabetes mellitus with stage 3b chronic kidney disease, without long-term current use of insulin " (H)  Comment: A1C was 6.9  Plan: continue without amaryl for now  -repeat A1C in 1-2 months, if >7 then restart amaryl    (A04.72) Colitis due to Clostridium difficile  Comment: Dx on 8/4  Plan: continue vanco QID through 8/15  -ordered Cdiff recheck on 8/16      Electronically signed by: VIDHYA Hayes CNP             Sincerely,        VIDHYA Hayes CNP

## 2022-08-12 ENCOUNTER — DISCHARGE SUMMARY NURSING HOME (OUTPATIENT)
Dept: GERIATRICS | Facility: CLINIC | Age: 87
End: 2022-08-12
Payer: COMMERCIAL

## 2022-08-12 VITALS
BODY MASS INDEX: 25.07 KG/M2 | HEIGHT: 68 IN | TEMPERATURE: 97.3 F | OXYGEN SATURATION: 97 % | SYSTOLIC BLOOD PRESSURE: 119 MMHG | WEIGHT: 165.4 LBS | HEART RATE: 69 BPM | DIASTOLIC BLOOD PRESSURE: 67 MMHG | RESPIRATION RATE: 18 BRPM

## 2022-08-12 DIAGNOSIS — A04.72 COLITIS DUE TO CLOSTRIDIUM DIFFICILE: Primary | ICD-10-CM

## 2022-08-12 DIAGNOSIS — T83.510D URINARY TRACT INFECTION ASSOCIATED WITH CYSTOSTOMY CATHETER, SUBSEQUENT ENCOUNTER: ICD-10-CM

## 2022-08-12 DIAGNOSIS — I10 ESSENTIAL HYPERTENSION: ICD-10-CM

## 2022-08-12 DIAGNOSIS — T36.95XA ANTIBIOTIC-ASSOCIATED DIARRHEA: ICD-10-CM

## 2022-08-12 DIAGNOSIS — Z90.79 S/P RADICAL CYSTOPROSTATECTOMY: ICD-10-CM

## 2022-08-12 DIAGNOSIS — E11.22 TYPE 2 DIABETES MELLITUS WITH STAGE 3B CHRONIC KIDNEY DISEASE, WITHOUT LONG-TERM CURRENT USE OF INSULIN (H): ICD-10-CM

## 2022-08-12 DIAGNOSIS — Z90.6 S/P RADICAL CYSTOPROSTATECTOMY: ICD-10-CM

## 2022-08-12 DIAGNOSIS — N39.0 URINARY TRACT INFECTION ASSOCIATED WITH CYSTOSTOMY CATHETER, SUBSEQUENT ENCOUNTER: ICD-10-CM

## 2022-08-12 DIAGNOSIS — I48.20 CHRONIC ATRIAL FIBRILLATION (H): ICD-10-CM

## 2022-08-12 DIAGNOSIS — N18.32 TYPE 2 DIABETES MELLITUS WITH STAGE 3B CHRONIC KIDNEY DISEASE, WITHOUT LONG-TERM CURRENT USE OF INSULIN (H): ICD-10-CM

## 2022-08-12 DIAGNOSIS — K52.1 ANTIBIOTIC-ASSOCIATED DIARRHEA: ICD-10-CM

## 2022-08-12 PROCEDURE — 99316 NF DSCHRG MGMT 30 MIN+: CPT | Performed by: NURSE PRACTITIONER

## 2022-08-12 NOTE — PROGRESS NOTES
[facility] GERIATRICS DISCHARGE SUMMARY  PATIENT'S NAME: Reuben Dowell  YOB: 1935  MEDICAL RECORD NUMBER:  [id number]  Place of Service where encounter took place:  [facility] ([facility]) [[id number]]    PRIMARY CARE PROVIDER AND CLINIC RESPONSIBLE AFTER TRANSFER:   [facility], [address] / [place] [address]    Non-FMG Provider     Transferring providers: VIDHYA Hayes CNP,   Recent Hospitalization/ED:  [facility] stay 7/24/22 to 7/27/22.  Date of SNF Admission: July 27, 2022  Date of SNF (anticipated) Discharge: August 13, 2022  Discharged to: previous independent home  Cognitive Scores: NA  Physical Function: Pivot transfers  DME: Walker    CODE STATUS/ADVANCE DIRECTIVES DISCUSSION:  No Order   ALLERGIES: Codeine sulfate [codeine] and Hydrocodone-acetaminophen    NURSING FACILITY COURSE   Medication Changes/Rationale:     See notes    Summary of nursing facility stay:      Colitis due to Clostridium difficile  Antibiotic-associated diarrhea  S/P radical cystoprostatectomy  Urinary tract infection associated with cystostomy catheter, subsequent encounter  Type 2 diabetes mellitus with stage 3b chronic kidney disease, without long-term current use of insulin (H)  Chronic atrial fibrillation (H)  Essential hypertension     (K52.1,  T36.95XA) Antibiotic-associated diarrhea  (primary encounter diagnosis)  (A04.72) Colitis due to Clostridium Difficile   Comment: post Cdiff, recent test negative  Plan:   -PT/OT eval and treat for deconditioning  -CBC, BMP, A1C on 8/1 results WNL  -UA/UC on 8/1 negative  -Cdiff test 8/4 positive  -started vanco 125mg QID x10d on 8/5, end 8/15  -consider recheck Cdiff on 8/16 or after     (Z90.79,  Z90.6) S/P radical cystoprostatectomy  (R33.9) Urine retention  (T83.511D,  N39.0) Urinary tract infection associated with catheterization of urinary tract, unspecified indwelling  "urinary catheter type, subsequent encounter  Comment: post TURP, urine retention, UTI+  Plan: levofloxacin 750mg every other day through 7/31 then discontinue  -change foster Q3 weeks  -consider trial void  -f/u  urology in 2 weeks     (E11.22,  N18.32) Type 2 diabetes mellitus with stage 3b chronic kidney disease, without long-term current use of insulin (H)  Comment: recent A1C 7.3  Plan: check BG's as scheduled  -continue amaryl 4mg  -A1C on 8/1 6.9     (I48.20) Chronic atrial fibrillation (H)  Comment: today RRR  Plan: continue metoprolol 50mg BID, asa 81, lipitor 40  -follow up cardiology PRN     (I10) Essential hypertension  Comment: SBP's in the 130-150 range  Plan: staff to check VS daily  -continue metoprolol 50mg BID, amlodipine 2.5mg daily  -adjust med regimen accordingly       Discharge Medications:    Current Outpatient Medications   Medication Sig Dispense Refill     acetaminophen (TYLENOL) 500 MG tablet Take 1,000 mg by mouth 3 times daily       amLODIPine (NORVASC) 2.5 MG tablet Take 2.5 mg by mouth daily       aspirin 81 MG EC tablet Take 81 mg by mouth daily       atorvastatin (LIPITOR) 40 MG tablet Take 40 mg by mouth daily       calcium carbonate 600 mg-vitamin D 400 units (CALTRATE) 600-400 MG-UNIT per tablet Take 1 tablet by mouth 2 times daily       metoprolol tartrate (LOPRESSOR) 50 MG tablet Take 50 mg by mouth 2 times daily       vancomycin (VANCOCIN) 125 MG capsule Take 1 capsule (125 mg) by mouth 4 times daily for 10 days 40 capsule 0        Controlled medications:   not applicable/none     Past Medical History: No past medical history on file.  Physical Exam:   Vitals: /67   Pulse 69   Temp 97.3  F (36.3  C)   Resp 18   Ht 1.727 m (5' 8\")   Wt 75 kg (165 lb 6.4 oz)   SpO2 97%   BMI 25.15 kg/m    BMI: Body mass index is 25.15 kg/m .  GENERAL APPEARANCE:  in no distress, appears healthy  ENT:  Mouth and posterior oropharynx normal, moist mucous membranes  RESP:  lungs " clear to auscultation   CV:  no edema, rate-normal  ABDOMEN:  bowel sounds normal  M/S:   Gait and station abnormal transfer assist  SKIN:  Inspection of skin and subcutaneous tissue baseline  NEURO:   Examination of sensation by touch normal  PSYCH:  oriented X 3     SNF labs: Labs done in SNF are in [place] EPIC. Please refer to them using EPIC/Care Everywhere.    DISCHARGE PLAN:    Follow up labs: No labs orders/due    Medical Follow Up:      Follow up with primary care provider in 1 weeks    Current [place] scheduled appointments:   NA    Discharge Services: Home Care:  Occupational Therapy, Physical Therapy, Registered Nurse and Home Health Aide    Discharge Instructions Verbalized to Patient at Discharge:     None    TOTAL DISCHARGE TIME:   Greater than 30 minutes  Electronically signed by:  VIDHYA Hayes CNP         Documentation of Face-to-Face and Certification for Home Health Services     Patient: Reuben Dowell   YOB: 1935  MR Number: [id number]  Today's Date: 8/12/2022    I certify that patient: Reuben Dowell is under my care and that I, or a nurse practitioner or physician's assistant working with me, had a face-to-face encounter that meets the physician face-to-face encounter requirements with this patient on: 8/12/2022.    This encounter with the patient was in whole, or in part, for the following medical condition, which is the primary reason for home health care:   1. Colitis due to Clostridium difficile    2. Antibiotic-associated diarrhea    3. S/P radical cystoprostatectomy    4. Urinary tract infection associated with cystostomy catheter, subsequent encounter    5. Type 2 diabetes mellitus with stage 3b chronic kidney disease, without long-term current use of insulin (H)    6. Chronic atrial fibrillation (H)    7. Essential hypertension        I certify that, based on my findings, the following services are medically necessary home health services: Nursing,  Occupational Therapy, Physical Therapy and HHA.    My clinical findings support the need for the above services because: Nurse is needed: To provide caregiver training to assist with: med setup, cath change, Cdiff testing.., Occupational Therapy Services are needed to assess and treat cognitive ability and address ADL safety due to impairment in DME eval, transfers., Physical Therapy Services are needed to assess and treat the following functional impairments: gait disturbance. and HHA for bathing safety    Further, I certify that my clinical findings support that this patient is homebound (i.e. absences from home require considerable and taxing effort and are for medical reasons or Buddhism services or infrequently or of short duration when for other reasons) because: Requires assistance of another person or specialized equipment to access medical services because patient: Is unable to operate assistive equipment on their own...    Based on the above findings. I certify that this patient is confined to the home and needs intermittent skilled nursing care, physical therapy and/or speech therapy.  The patient is under my care, and I have initiated the establishment of the plan of care.  This patient will be followed by a physician who will periodically review the plan of care.  Physician/Provider to provide follow up care: [facility]    Responsible Medicare certified PECMARGI Physician: Jesse Cutler NP  Electronic Physician Signature  Date: 8/12/2022

## 2022-08-12 NOTE — LETTER
8/12/2022        RE: Reuben Dowell  [address]  [facility] [address]        [facility] GERIATRICS DISCHARGE SUMMARY  PATIENT'S NAME: Reuben Dowell  YOB: 1935  MEDICAL RECORD NUMBER:  [id number]  Place of Service where encounter took place:  [facility] ([facility]) [[id number]]    PRIMARY CARE PROVIDER AND CLINIC RESPONSIBLE AFTER TRANSFER:   [facility] [address] [facility] [address]    Non-FMG Provider     Transferring providers: VIDHYA Hayes CNP,   Recent Hospitalization/ED:  [facility] stay 7/24/22 to 7/27/22.  Date of SNF Admission: July 27, 2022  Date of SNF (anticipated) Discharge: August 13, 2022  Discharged to: previous independent home  Cognitive Scores: NA  Physical Function: Pivot transfers  DME: Walker    CODE STATUS/ADVANCE DIRECTIVES DISCUSSION:  No Order   ALLERGIES: Codeine sulfate [codeine] and Hydrocodone-acetaminophen    NURSING FACILITY COURSE   Medication Changes/Rationale:     See notes    Summary of nursing facility stay:      Colitis due to Clostridium difficile  Antibiotic-associated diarrhea  S/P radical cystoprostatectomy  Urinary tract infection associated with cystostomy catheter, subsequent encounter  Type 2 diabetes mellitus with stage 3b chronic kidney disease, without long-term current use of insulin (H)  Chronic atrial fibrillation (H)  Essential hypertension     (K52.1,  T36.95XA) Antibiotic-associated diarrhea  (primary encounter diagnosis)  (A04.72) Colitis due to Clostridium Difficile   Comment: post Cdiff, recent test negative  Plan:   -PT/OT eval and treat for deconditioning  -CBC, BMP, A1C on 8/1 results WNL  -UA/UC on 8/1 negative  -Cdiff test 8/4 positive  -started vanco 125mg QID x10d on 8/5, end 8/15  -consider recheck Cdiff on 8/16 or after     (Z90.79,  Z90.6) S/P radical cystoprostatectomy  (R33.9) Urine retention  (T83.511D,  N39.0) Urinary  "tract infection associated with catheterization of urinary tract, unspecified indwelling urinary catheter type, subsequent encounter  Comment: post TURP, urine retention, UTI+  Plan: levofloxacin 750mg every other day through 7/31 then discontinue  -change foster Q3 weeks  -consider trial void  -f/u  urology in 2 weeks     (E11.22,  N18.32) Type 2 diabetes mellitus with stage 3b chronic kidney disease, without long-term current use of insulin (H)  Comment: recent A1C 7.3  Plan: check BG's as scheduled  -continue amaryl 4mg  -A1C on 8/1 6.9     (I48.20) Chronic atrial fibrillation (H)  Comment: today RRR  Plan: continue metoprolol 50mg BID, asa 81, lipitor 40  -follow up cardiology PRN     (I10) Essential hypertension  Comment: SBP's in the 130-150 range  Plan: staff to check VS daily  -continue metoprolol 50mg BID, amlodipine 2.5mg daily  -adjust med regimen accordingly       Discharge Medications:    Current Outpatient Medications   Medication Sig Dispense Refill     acetaminophen (TYLENOL) 500 MG tablet Take 1,000 mg by mouth 3 times daily       amLODIPine (NORVASC) 2.5 MG tablet Take 2.5 mg by mouth daily       aspirin 81 MG EC tablet Take 81 mg by mouth daily       atorvastatin (LIPITOR) 40 MG tablet Take 40 mg by mouth daily       calcium carbonate 600 mg-vitamin D 400 units (CALTRATE) 600-400 MG-UNIT per tablet Take 1 tablet by mouth 2 times daily       metoprolol tartrate (LOPRESSOR) 50 MG tablet Take 50 mg by mouth 2 times daily       vancomycin (VANCOCIN) 125 MG capsule Take 1 capsule (125 mg) by mouth 4 times daily for 10 days 40 capsule 0        Controlled medications:   not applicable/none     Past Medical History: No past medical history on file.  Physical Exam:   Vitals: /67   Pulse 69   Temp 97.3  F (36.3  C)   Resp 18   Ht 1.727 m (5' 8\")   Wt 75 kg (165 lb 6.4 oz)   SpO2 97%   BMI 25.15 kg/m    BMI: Body mass index is 25.15 kg/m .  GENERAL APPEARANCE:  in no distress, appears " healthy  ENT:  Mouth and posterior oropharynx normal, moist mucous membranes  RESP:  lungs clear to auscultation   CV:  no edema, rate-normal  ABDOMEN:  bowel sounds normal  M/S:   Gait and station abnormal transfer assist  SKIN:  Inspection of skin and subcutaneous tissue baseline  NEURO:   Examination of sensation by touch normal  PSYCH:  oriented X 3     SNF labs: Labs done in SNF are in [place] EPIC. Please refer to them using EPIC/Care Everywhere.    DISCHARGE PLAN:    Follow up labs: No labs orders/due    Medical Follow Up:      Follow up with primary care provider in 1 weeks    Current [place] scheduled appointments:   NA    Discharge Services: Home Care:  Occupational Therapy, Physical Therapy, Registered Nurse and Home Health Aide    Discharge Instructions Verbalized to Patient at Discharge:     None    TOTAL DISCHARGE TIME:   Greater than 30 minutes  Electronically signed by:  VIDHYA Hayes CNP         Documentation of Face-to-Face and Certification for Home Health Services     Patient: Reuben Dowell   YOB: 1935  MR Number: [id number]  Today's Date: 8/12/2022    I certify that patient: Reuben Dowell is under my care and that I, or a nurse practitioner or physician's assistant working with me, had a face-to-face encounter that meets the physician face-to-face encounter requirements with this patient on: 8/12/2022.    This encounter with the patient was in whole, or in part, for the following medical condition, which is the primary reason for home health care:   1. Colitis due to Clostridium difficile    2. Antibiotic-associated diarrhea    3. S/P radical cystoprostatectomy    4. Urinary tract infection associated with cystostomy catheter, subsequent encounter    5. Type 2 diabetes mellitus with stage 3b chronic kidney disease, without long-term current use of insulin (H)    6. Chronic atrial fibrillation (H)    7. Essential hypertension        I certify that, based on  my findings, the following services are medically necessary home health services: Nursing, Occupational Therapy, Physical Therapy and HHA.    My clinical findings support the need for the above services because: Nurse is needed: To provide caregiver training to assist with: med setup, cath change, Cdiff testing.., Occupational Therapy Services are needed to assess and treat cognitive ability and address ADL safety due to impairment in DME eval, transfers., Physical Therapy Services are needed to assess and treat the following functional impairments: gait disturbance. and HHA for bathing safety    Further, I certify that my clinical findings support that this patient is homebound (i.e. absences from home require considerable and taxing effort and are for medical reasons or Spiritism services or infrequently or of short duration when for other reasons) because: Requires assistance of another person or specialized equipment to access medical services because patient: Is unable to operate assistive equipment on their own...    Based on the above findings. I certify that this patient is confined to the home and needs intermittent skilled nursing care, physical therapy and/or speech therapy.  The patient is under my care, and I have initiated the establishment of the plan of care.  This patient will be followed by a physician who will periodically review the plan of care.  Physician/Provider to provide follow up care: [facility]    Responsible Medicare certified PEC Physician: Jesse Cutler NP  Electronic Physician Signature  Date: 8/12/2022                    Sincerely,        VIDHYA Hayes CNP

## 2022-08-23 ENCOUNTER — TRANSITIONAL CARE UNIT VISIT (OUTPATIENT)
Dept: GERIATRICS | Facility: CLINIC | Age: 87
End: 2022-08-23
Payer: COMMERCIAL

## 2022-08-23 VITALS
DIASTOLIC BLOOD PRESSURE: 76 MMHG | RESPIRATION RATE: 20 BRPM | TEMPERATURE: 98.4 F | BODY MASS INDEX: 25.04 KG/M2 | HEIGHT: 68 IN | HEART RATE: 72 BPM | OXYGEN SATURATION: 97 % | SYSTOLIC BLOOD PRESSURE: 135 MMHG | WEIGHT: 165.2 LBS

## 2022-08-23 DIAGNOSIS — A04.72 COLITIS DUE TO CLOSTRIDIUM DIFFICILE: ICD-10-CM

## 2022-08-23 DIAGNOSIS — N18.32 TYPE 2 DIABETES MELLITUS WITH STAGE 3B CHRONIC KIDNEY DISEASE, WITHOUT LONG-TERM CURRENT USE OF INSULIN (H): ICD-10-CM

## 2022-08-23 DIAGNOSIS — N39.0 SEPSIS DUE TO URINARY TRACT INFECTION (H): Primary | ICD-10-CM

## 2022-08-23 DIAGNOSIS — Z97.8 FOLEY CATHETER PRESENT: ICD-10-CM

## 2022-08-23 DIAGNOSIS — R33.9 URINE RETENTION: ICD-10-CM

## 2022-08-23 DIAGNOSIS — A41.9 SEPSIS DUE TO URINARY TRACT INFECTION (H): Primary | ICD-10-CM

## 2022-08-23 DIAGNOSIS — I48.20 CHRONIC ATRIAL FIBRILLATION (H): ICD-10-CM

## 2022-08-23 DIAGNOSIS — T83.511D URINARY TRACT INFECTION ASSOCIATED WITH INDWELLING URETHRAL CATHETER, SUBSEQUENT ENCOUNTER: ICD-10-CM

## 2022-08-23 DIAGNOSIS — E11.22 TYPE 2 DIABETES MELLITUS WITH STAGE 3B CHRONIC KIDNEY DISEASE, WITHOUT LONG-TERM CURRENT USE OF INSULIN (H): ICD-10-CM

## 2022-08-23 DIAGNOSIS — N39.0 URINARY TRACT INFECTION ASSOCIATED WITH INDWELLING URETHRAL CATHETER, SUBSEQUENT ENCOUNTER: ICD-10-CM

## 2022-08-23 PROCEDURE — 99309 SBSQ NF CARE MODERATE MDM 30: CPT | Performed by: NURSE PRACTITIONER

## 2022-08-23 RX ORDER — INSULIN ASPART 100 [IU]/ML
INJECTION, SOLUTION INTRAVENOUS; SUBCUTANEOUS AT BEDTIME
COMMUNITY

## 2022-08-23 RX ORDER — VANCOMYCIN HYDROCHLORIDE 25 MG/ML
125 KIT ORAL 4 TIMES DAILY
COMMUNITY
Start: 2022-08-22 | End: 2022-08-25

## 2022-08-23 RX ORDER — INSULIN ASPART 100 [IU]/ML
INJECTION, SOLUTION INTRAVENOUS; SUBCUTANEOUS
COMMUNITY

## 2022-08-23 NOTE — LETTER
8/23/2022        RE: Reuben Dowell  [address]  [facility] [address]        [facility] GERIATRICS    PRIMARY CARE PROVIDER AND CLINIC:  [facility], [address] [place] / [place] [address]  Chief Complaint   Patient presents with     Hospital F/U      [place] Medical Record Number:  [id number]  Place of Service where encounter took place:  [facility] ([facility]) [[id number]]    Reuben Dowell  is a 87 year old  (1935), admitted to the above facility from  [facility]. Hospital stay 8/14/22 through 8/22/22..   HPI:    HPI: This is an 87 y.o. male hospitalized 7/19-22 for sepsis, then again 7/23-27 for diarrhea and discharged to TCU, returned home on 8/13 and then returned to ED on 8/14, disoriented, multiple loose stools, vomiting, not eating or drinking, and not taking any of his medications, in process of finishing vancomycin for Cdiff, was afebrile, , RR 24, /89, SpO2 96% on RA, WBC 22.4, K 5.4, Cr 1.70, , ALT 43, tbili 2.0, procalcitonin 0.25, BHB 0.51, UA concerning, EKG sinus tachycardia, CXR benign, CT A/P benign, started levofloxacin, foster changed, formed stool, encephalopathy to improve with Tx infection, recent A1C 7.3, elevated LFT's no biliary pathology, transfer to TCU.    Patient seen for follow up, ambulating in room/posadas with therapy present, pleasantly confused, limited historian, appears healthy, foster output WNL, formed stool, afebrile, no distress.        CODE STATUS/ADVANCE DIRECTIVES DISCUSSION:  No Order  CPR/Full code   ALLERGIES:   Allergies   Allergen Reactions     Codeine Sulfate [Codeine]      Hydrocodone-Acetaminophen       PAST MEDICAL HISTORY: No past medical history on file.   PAST SURGICAL HISTORY:   has no past surgical history on file.  FAMILY HISTORY: family history is not on file.  SOCIAL HISTORY:     Patient's living condition: lives alone    Post Discharge Medication Reconciliation  Status:   Post Medication Reconciliation Status: Discharge medications reconciled, continue medications without change       Current Outpatient Medications   Medication Sig     acetaminophen (TYLENOL) 500 MG tablet Take 1,000 mg by mouth 3 times daily as needed     amLODIPine (NORVASC) 2.5 MG tablet Take 2.5 mg by mouth daily     aspirin 81 MG EC tablet Take 81 mg by mouth daily     atorvastatin (LIPITOR) 40 MG tablet Take 40 mg by mouth daily     calcium carbonate 600 mg-vitamin D 400 units (CALTRATE) 600-400 MG-UNIT per tablet Take 1 tablet by mouth 2 times daily     insulin aspart (NOVOLOG FLEXPEN) 100 UNIT/ML pen Inject Subcutaneous At Bedtime Per Sliding Scale;  If Blood Sugar is 120 to 149, give 0 Units.  If Blood Sugar is 150 to 199, give 2 Units.  If Blood Sugar is 200 to 249, give 3 Units.  If Blood Sugar is 250 to 299, give 5 Units.  If Blood Sugar is 300 to 349, give 6 Units.  If Blood Sugar is 350 to 351, give 8 Units.  If Blood Sugar is greater than 351, call MD.  subcutaneous  Special Instructions: If BS Greater than 350, give insulin as directed. Recheck BS in 2 hours. If BS still greater than 350, contact doctor  At Bedtime     insulin aspart (NOVOLOG FLEXPEN) 100 UNIT/ML pen Inject Subcutaneous 3 times daily (with meals) Sliding Scale;  If Blood Sugar is 120 to 149, give 2 Units.  If Blood Sugar is 150 to 199, give 3 Units.  If Blood Sugar is 200 to 249, give 6 Units.  If Blood Sugar is 250 to 299, give 9 Units.  If Blood Sugar is 300 to 349, give 12 Units.  If Blood Sugar is 350 to 351, give 15 Units.  If Blood Sugar is greater than 351, call MD.  subcutaneous  Special Instructions: with meals If BS greater than 350, give insulin as directed. Recheck BS in 2 hours. If BS still higher than 350, contact your doctor. BS less than 120     vancomycin (FIRVANQ) 25 MG/ML oral solution Take 125 mg by mouth 4 times daily     metoprolol tartrate (LOPRESSOR) 50 MG tablet Take 50 mg by mouth 2 times daily  "    No current facility-administered medications for this visit.       ROS:  4 point ROS including Respiratory, CV, GI and , other than that noted in the HPI,  is negative    Vitals:  /76   Pulse 72   Temp 98.4  F (36.9  C)   Resp 20   Ht 1.727 m (5' 8\")   Wt 74.9 kg (165 lb 3.2 oz)   SpO2 97%   BMI 25.12 kg/m    Exam:  GENERAL APPEARANCE:  in no distress, appears healthy  ENT:  Mouth and posterior oropharynx normal, moist mucous membranes  RESP:  lungs clear to auscultation   CV:  no edema, rate-normal  ABDOMEN:  bowel sounds normal  M/S:   Gait and station abnormal unsteady gait  SKIN:  Inspection of skin and subcutaneous tissue baseline  NEURO:   Examination of sensation by touch normal  PSYCH:  oriented X 3    Lab/Diagnostic data:  Recent labs in [facility] reviewed by me today.     ASSESSMENT/PLAN:    (A41.9,  N39.0) Sepsis due to urinary tract infection (H)  (primary encounter diagnosis)  (R33.9) Urine retention  (T83.511D,  N39.0) Urinary tract infection associated with indwelling urethral catheter, subsequent encounter  (Z97.8) Foster catheter present  Comment: WBC was 22.4, treated with levofloxacin, foster changed  Plan:   -PT/OT eval and treat  -CBC, BMP, A1C on 8/29  -continue oral vanco through 8/25 then discontinue  -follow up Cdiff testing only if loose  -change foster monthly      (A04.72) Colitis due to Clostridium difficile  Comment: formed stool in hospital  Plan: follow up Cdiff testing PRN    (E11.22,  N18.32) Type 2 diabetes mellitus with stage 3b chronic kidney disease, without long-term current use of insulin (H)  Comment: recent A1C was 7.3  Plan: follow up A1C on 8/29    (I48.20) Chronic atrial fibrillation (H)  Comment: today RRR, denies CP  Plan: continue statin, ASA81  -follow up cardiology PRN        Electronically signed by:  VIDHYA Hayes CNP                       Sincerely,        VIDHYA Hayes CNP    "

## 2022-08-23 NOTE — PROGRESS NOTES
[facility] GERIATRICS    PRIMARY CARE PROVIDER AND CLINIC:  [facility], [address] / [place] [address]  Chief Complaint   Patient presents with     Hospital F/U      [place] Medical Record Number:  [id number]  Place of Service where encounter took place:  [facility]  AT [facility] ([facility]) [[id number]]    Reuben Dowell  is a 87 year old  (1935), admitted to the above facility from  [facility]. Hospital stay 8/14/22 through 8/22/22..   HPI:    HPI: This is an 87 y.o. male hospitalized 7/19-22 for sepsis, then again 7/23-27 for diarrhea and discharged to TCU, returned home on 8/13 and then returned to ED on 8/14, disoriented, multiple loose stools, vomiting, not eating or drinking, and not taking any of his medications, in process of finishing vancomycin for Cdiff, was afebrile, , RR 24, /89, SpO2 96% on RA, WBC 22.4, K 5.4, Cr 1.70, , ALT 43, tbili 2.0, procalcitonin 0.25, BHB 0.51, UA concerning, EKG sinus tachycardia, CXR benign, CT A/P benign, started levofloxacin, foster changed, formed stool, encephalopathy to improve with Tx infection, recent A1C 7.3, elevated LFT's no biliary pathology, transfer to TCU.    Patient seen for follow up, ambulating in room/posadas with therapy present, pleasantly confused, limited historian, appears healthy, foster output WNL, formed stool, afebrile, no distress.        CODE STATUS/ADVANCE DIRECTIVES DISCUSSION:  No Order  CPR/Full code   ALLERGIES:   Allergies   Allergen Reactions     Codeine Sulfate [Codeine]      Hydrocodone-Acetaminophen       PAST MEDICAL HISTORY: No past medical history on file.   PAST SURGICAL HISTORY:   has no past surgical history on file.  FAMILY HISTORY: family history is not on file.  SOCIAL HISTORY:     Patient's living condition: lives alone    Post Discharge Medication Reconciliation Status:   Post Medication Reconciliation Status: Discharge medications reconciled,  continue medications without change       Current Outpatient Medications   Medication Sig     acetaminophen (TYLENOL) 500 MG tablet Take 1,000 mg by mouth 3 times daily as needed     amLODIPine (NORVASC) 2.5 MG tablet Take 2.5 mg by mouth daily     aspirin 81 MG EC tablet Take 81 mg by mouth daily     atorvastatin (LIPITOR) 40 MG tablet Take 40 mg by mouth daily     calcium carbonate 600 mg-vitamin D 400 units (CALTRATE) 600-400 MG-UNIT per tablet Take 1 tablet by mouth 2 times daily     insulin aspart (NOVOLOG FLEXPEN) 100 UNIT/ML pen Inject Subcutaneous At Bedtime Per Sliding Scale;  If Blood Sugar is 120 to 149, give 0 Units.  If Blood Sugar is 150 to 199, give 2 Units.  If Blood Sugar is 200 to 249, give 3 Units.  If Blood Sugar is 250 to 299, give 5 Units.  If Blood Sugar is 300 to 349, give 6 Units.  If Blood Sugar is 350 to 351, give 8 Units.  If Blood Sugar is greater than 351, call MD.  subcutaneous  Special Instructions: If BS Greater than 350, give insulin as directed. Recheck BS in 2 hours. If BS still greater than 350, contact doctor  At Bedtime     insulin aspart (NOVOLOG FLEXPEN) 100 UNIT/ML pen Inject Subcutaneous 3 times daily (with meals) Sliding Scale;  If Blood Sugar is 120 to 149, give 2 Units.  If Blood Sugar is 150 to 199, give 3 Units.  If Blood Sugar is 200 to 249, give 6 Units.  If Blood Sugar is 250 to 299, give 9 Units.  If Blood Sugar is 300 to 349, give 12 Units.  If Blood Sugar is 350 to 351, give 15 Units.  If Blood Sugar is greater than 351, call MD.  subcutaneous  Special Instructions: with meals If BS greater than 350, give insulin as directed. Recheck BS in 2 hours. If BS still higher than 350, contact your doctor. BS less than 120     vancomycin (FIRVANQ) 25 MG/ML oral solution Take 125 mg by mouth 4 times daily     metoprolol tartrate (LOPRESSOR) 50 MG tablet Take 50 mg by mouth 2 times daily     No current facility-administered medications for this visit.       ROS:  4 point  "ROS including Respiratory, CV, GI and , other than that noted in the HPI,  is negative    Vitals:  /76   Pulse 72   Temp 98.4  F (36.9  C)   Resp 20   Ht 1.727 m (5' 8\")   Wt 74.9 kg (165 lb 3.2 oz)   SpO2 97%   BMI 25.12 kg/m    Exam:  GENERAL APPEARANCE:  in no distress, appears healthy  ENT:  Mouth and posterior oropharynx normal, moist mucous membranes  RESP:  lungs clear to auscultation   CV:  no edema, rate-normal  ABDOMEN:  bowel sounds normal  M/S:   Gait and station abnormal unsteady gait  SKIN:  Inspection of skin and subcutaneous tissue baseline  NEURO:   Examination of sensation by touch normal  PSYCH:  oriented X 3    Lab/Diagnostic data:  Recent labs in [facility] reviewed by me today.     ASSESSMENT/PLAN:    (A41.9,  N39.0) Sepsis due to urinary tract infection (H)  (primary encounter diagnosis)  (R33.9) Urine retention  (T83.511D,  N39.0) Urinary tract infection associated with indwelling urethral catheter, subsequent encounter  (Z97.8) Foster catheter present  Comment: WBC was 22.4, treated with levofloxacin, foster changed  Plan:   -PT/OT eval and treat  -CBC, BMP, A1C on 8/29  -continue oral vanco through 8/25 then discontinue  -follow up Cdiff testing only if loose  -change foster monthly      (A04.72) Colitis due to Clostridium difficile  Comment: formed stool in hospital  Plan: follow up Cdiff testing PRN    (E11.22,  N18.32) Type 2 diabetes mellitus with stage 3b chronic kidney disease, without long-term current use of insulin (H)  Comment: recent A1C was 7.3  Plan: follow up A1C on 8/29    (I48.20) Chronic atrial fibrillation (H)  Comment: today RRR, denies CP  Plan: continue statin, ASA81  -follow up cardiology PRN        Electronically signed by:  VIDHYA Hayes CNP                 "

## 2022-08-26 ENCOUNTER — TRANSITIONAL CARE UNIT VISIT (OUTPATIENT)
Dept: GERIATRICS | Facility: CLINIC | Age: 87
End: 2022-08-26
Payer: COMMERCIAL

## 2022-08-26 VITALS
HEIGHT: 68 IN | HEART RATE: 96 BPM | BODY MASS INDEX: 24.08 KG/M2 | SYSTOLIC BLOOD PRESSURE: 145 MMHG | RESPIRATION RATE: 18 BRPM | OXYGEN SATURATION: 98 % | DIASTOLIC BLOOD PRESSURE: 87 MMHG | WEIGHT: 158.9 LBS | TEMPERATURE: 97.5 F

## 2022-08-26 DIAGNOSIS — Z97.8 FOLEY CATHETER PRESENT: ICD-10-CM

## 2022-08-26 DIAGNOSIS — R33.9 URINE RETENTION: ICD-10-CM

## 2022-08-26 DIAGNOSIS — E11.22 TYPE 2 DIABETES MELLITUS WITH STAGE 3B CHRONIC KIDNEY DISEASE, WITHOUT LONG-TERM CURRENT USE OF INSULIN (H): Primary | ICD-10-CM

## 2022-08-26 DIAGNOSIS — N18.32 TYPE 2 DIABETES MELLITUS WITH STAGE 3B CHRONIC KIDNEY DISEASE, WITHOUT LONG-TERM CURRENT USE OF INSULIN (H): Primary | ICD-10-CM

## 2022-08-26 PROCEDURE — 99309 SBSQ NF CARE MODERATE MDM 30: CPT | Performed by: NURSE PRACTITIONER

## 2022-08-26 NOTE — PROGRESS NOTES
"[facility] GERIATRICS    Chief Complaint   Patient presents with     RECHECK     HPI:  Reuben Dowell is a 87 year old  (1935), who is being seen today for an episodic care visit at: [facility] AT [facility] ([facility]) [[id number]]. Today's concern is:   1. Type 2 diabetes mellitus with stage 3b chronic kidney disease, without long-term current use of insulin (H)    2. Urine retention    3. Foster catheter present      Patient seen for follow up, pleasant, mild cognitive limitations, able to make needs known, completing vanco today for sepsis/UTI/Cdiff, chronic foster with normal output, no s/s UTI, cognition clearing as infection clears, denies pain, participating with therapies, no distress.    Allergies, and PMH/PSH reviewed in [facility] today.  REVIEW OF SYSTEMS:  4 point ROS including Respiratory, CV, GI and , other than that noted in the HPI,  is negative    Objective:   BP (!) 145/87   Pulse 96   Temp 97.5  F (36.4  C)   Resp 18   Ht 1.727 m (5' 8\")   Wt 72.1 kg (158 lb 14.4 oz)   SpO2 98%   BMI 24.16 kg/m    GENERAL APPEARANCE:  in no distress, appears healthy  ENT:  Mouth and posterior oropharynx normal, moist mucous membranes  RESP:  lungs clear to auscultation   CV:  no edema  ABDOMEN:  bowel sounds normal  M/S:   Gait and station abnormal unsteady gait  SKIN:  Inspection of skin and subcutaneous tissue baseline  NEURO:   Examination of sensation by touch normal  PSYCH:  oriented X 3    Labs done in SNF are in [facility]. Please refer to them using [facility]/Care Everywhere.    Assessment/Plan:  (E11.22,  N18.32) Type 2 diabetes mellitus with stage 3b chronic kidney disease, without long-term current use of insulin (H)  (primary encounter diagnosis)  Comment: A1C on 8/1 was 6.9  Plan: continue without medication intervention  -recheck A1C on 8/29  -if result >7 plan to intervene with oral med    (R33.9) Urine retention  (Z97.8) Foster catheter present  Comment: chronic foster, probable lifetime  Plan: " staff to change foster Qmonth, bags Q14d  -monitor output  -follow up urology after TCU discharge        Electronically signed by: VIDHYA Hayes CNP

## 2022-08-26 NOTE — LETTER
"    8/26/2022        RE: Reuben Dowell  [address]  [facility] [address]        [facility] GERIATRICS    Chief Complaint   Patient presents with     RECHECK     HPI:  Reuben Dowell is a 87 year old  (1935), who is being seen today for an episodic care visit at: [facility] AT [facility] ([facility]) [[id number]]. Today's concern is:   1. Type 2 diabetes mellitus with stage 3b chronic kidney disease, without long-term current use of insulin (H)    2. Urine retention    3. Foster catheter present      Patient seen for follow up, pleasant, mild cognitive limitations, able to make needs known, completing vanco today for sepsis/UTI/Cdiff, chronic foster with normal output, no s/s UTI, cognition clearing as infection clears, denies pain, participating with therapies, no distress.    Allergies, and PMH/PSH reviewed in [facility] today.  REVIEW OF SYSTEMS:  4 point ROS including Respiratory, CV, GI and , other than that noted in the HPI,  is negative    Objective:   BP (!) 145/87   Pulse 96   Temp 97.5  F (36.4  C)   Resp 18   Ht 1.727 m (5' 8\")   Wt 72.1 kg (158 lb 14.4 oz)   SpO2 98%   BMI 24.16 kg/m    GENERAL APPEARANCE:  in no distress, appears healthy  ENT:  Mouth and posterior oropharynx normal, moist mucous membranes  RESP:  lungs clear to auscultation   CV:  no edema  ABDOMEN:  bowel sounds normal  M/S:   Gait and station abnormal unsteady gait  SKIN:  Inspection of skin and subcutaneous tissue baseline  NEURO:   Examination of sensation by touch normal  PSYCH:  oriented X 3    Labs done in SNF are in [facility]. Please refer to them using [facility]/Care Everywhere.    Assessment/Plan:  (E11.22,  N18.32) Type 2 diabetes mellitus with stage 3b chronic kidney disease, without long-term current use of insulin (H)  (primary encounter diagnosis)  Comment: A1C on 8/1 was 6.9  Plan: continue without medication intervention  -recheck A1C on 8/29  -if result >7 plan to intervene with oral med    (R33.9) Urine " retention  (Z97.8) Foster catheter present  Comment: chronic foster, probable lifetime  Plan: staff to change foster Qmonth, bags Q14d  -monitor output  -follow up urology after TCU discharge        Electronically signed by: VIDHYA Hayes CNP           Sincerely,        VIDHYA Hayes CNP

## 2022-08-29 ENCOUNTER — LAB REQUISITION (OUTPATIENT)
Dept: LAB | Facility: CLINIC | Age: 87
End: 2022-08-29
Payer: COMMERCIAL

## 2022-08-29 ENCOUNTER — LAB REQUISITION (OUTPATIENT)
Dept: LAB | Facility: CLINIC | Age: 87
End: 2022-08-29

## 2022-08-29 DIAGNOSIS — E11.22 TYPE 2 DIABETES MELLITUS WITH DIABETIC CHRONIC KIDNEY DISEASE (H): ICD-10-CM

## 2022-08-29 LAB
ANION GAP SERPL CALCULATED.3IONS-SCNC: 15 MMOL/L (ref 7–15)
BASOPHILS # BLD AUTO: 0.1 10E3/UL (ref 0–0.2)
BASOPHILS NFR BLD AUTO: 1 %
BUN SERPL-MCNC: 21.7 MG/DL (ref 8–23)
CALCIUM SERPL-MCNC: 10 MG/DL (ref 8.8–10.2)
CHLORIDE SERPL-SCNC: 102 MMOL/L (ref 98–107)
CREAT SERPL-MCNC: 1.65 MG/DL (ref 0.67–1.17)
DEPRECATED HCO3 PLAS-SCNC: 23 MMOL/L (ref 22–29)
EOSINOPHIL # BLD AUTO: 0.7 10E3/UL (ref 0–0.7)
EOSINOPHIL NFR BLD AUTO: 8 %
ERYTHROCYTE [DISTWIDTH] IN BLOOD BY AUTOMATED COUNT: 14.8 % (ref 10–15)
GFR SERPL CREATININE-BSD FRML MDRD: 40 ML/MIN/1.73M2
GLUCOSE SERPL-MCNC: 130 MG/DL (ref 70–99)
HCT VFR BLD AUTO: 42.1 % (ref 40–53)
HGB BLD-MCNC: 14 G/DL (ref 13.3–17.7)
IMM GRANULOCYTES # BLD: 0 10E3/UL
IMM GRANULOCYTES NFR BLD: 0 %
LYMPHOCYTES # BLD AUTO: 1.9 10E3/UL (ref 0.8–5.3)
LYMPHOCYTES NFR BLD AUTO: 20 %
MCH RBC QN AUTO: 28.9 PG (ref 26.5–33)
MCHC RBC AUTO-ENTMCNC: 33.3 G/DL (ref 31.5–36.5)
MCV RBC AUTO: 87 FL (ref 78–100)
MONOCYTES # BLD AUTO: 0.7 10E3/UL (ref 0–1.3)
MONOCYTES NFR BLD AUTO: 8 %
NEUTROPHILS # BLD AUTO: 6.1 10E3/UL (ref 1.6–8.3)
NEUTROPHILS NFR BLD AUTO: 63 %
NRBC # BLD AUTO: 0 10E3/UL
NRBC BLD AUTO-RTO: 0 /100
PLATELET # BLD AUTO: 199 10E3/UL (ref 150–450)
POTASSIUM SERPL-SCNC: 4.3 MMOL/L (ref 3.4–5.3)
RBC # BLD AUTO: 4.84 10E6/UL (ref 4.4–5.9)
SODIUM SERPL-SCNC: 140 MMOL/L (ref 136–145)
WBC # BLD AUTO: 9.5 10E3/UL (ref 4–11)

## 2022-08-29 PROCEDURE — 83036 HEMOGLOBIN GLYCOSYLATED A1C: CPT | Performed by: NURSE PRACTITIONER

## 2022-08-29 PROCEDURE — 85025 COMPLETE CBC W/AUTO DIFF WBC: CPT | Performed by: NURSE PRACTITIONER

## 2022-08-29 PROCEDURE — 80048 BASIC METABOLIC PNL TOTAL CA: CPT | Performed by: NURSE PRACTITIONER

## 2022-08-29 PROCEDURE — 36415 COLL VENOUS BLD VENIPUNCTURE: CPT | Performed by: NURSE PRACTITIONER

## 2022-08-30 ENCOUNTER — TRANSITIONAL CARE UNIT VISIT (OUTPATIENT)
Dept: GERIATRICS | Facility: CLINIC | Age: 87
End: 2022-08-30
Payer: COMMERCIAL

## 2022-08-30 VITALS
DIASTOLIC BLOOD PRESSURE: 88 MMHG | SYSTOLIC BLOOD PRESSURE: 136 MMHG | HEIGHT: 68 IN | TEMPERATURE: 97.5 F | OXYGEN SATURATION: 95 % | BODY MASS INDEX: 24.08 KG/M2 | WEIGHT: 158.9 LBS | HEART RATE: 84 BPM | RESPIRATION RATE: 18 BRPM

## 2022-08-30 DIAGNOSIS — A04.72 COLITIS DUE TO CLOSTRIDIUM DIFFICILE: ICD-10-CM

## 2022-08-30 DIAGNOSIS — N18.30 STAGE 3 CHRONIC KIDNEY DISEASE, UNSPECIFIED WHETHER STAGE 3A OR 3B CKD (H): Primary | ICD-10-CM

## 2022-08-30 DIAGNOSIS — Z71.89 ACP (ADVANCE CARE PLANNING): ICD-10-CM

## 2022-08-30 DIAGNOSIS — Z97.8 FOLEY CATHETER PRESENT: ICD-10-CM

## 2022-08-30 PROBLEM — A41.9 SEPSIS (H): Status: ACTIVE | Noted: 2022-08-14

## 2022-08-30 PROBLEM — R33.9 RETENTION OF URINE: Status: ACTIVE | Noted: 2021-07-27

## 2022-08-30 PROBLEM — R29.6 RECURRENT FALLS: Status: ACTIVE | Noted: 2021-03-02

## 2022-08-30 PROBLEM — N39.0 ACUTE URINARY TRACT INFECTION: Status: ACTIVE | Noted: 2022-04-24

## 2022-08-30 PROCEDURE — 99497 ADVNCD CARE PLAN 30 MIN: CPT | Mod: 25 | Performed by: NURSE PRACTITIONER

## 2022-08-30 PROCEDURE — 99309 SBSQ NF CARE MODERATE MDM 30: CPT | Mod: 25 | Performed by: NURSE PRACTITIONER

## 2022-08-30 NOTE — PROGRESS NOTES
[facility] GERIATRICS  [place] Medical Record Number: [id number]  Chief Complaint   Patient presents with     RECHECK     HPI: Reuben Dowell is a 87 year old (1935), who is being seen today for an episodic care visit at: [facility] AT [facility] ([facility]) [[id number]]. Today's concern is:   1. Stage 3 chronic kidney disease, unspecified whether stage 3a or 3b CKD (H)    2. Colitis due to Clostridium difficile    3. Foster catheter present    4. ACP (advance care planning)      Patient seen for follow up, labs on 8/29 with creat elevated at 1.65, GFR 40, previous GFR was 45, limited intake, diarrhea had resolved when on vanco through 8/25 and now has had a single loose stool, unsure if Cdiff recurring or not, foster output with lifetime catheter WNL, overall appears healthy but not thriving and makes excuses to not participate with therapy, overall no distress.    Advance Care Planning Goals of Care Discussion 8/30/2022  Goals of care discussed with Reuben Dowell on 8/30. Present at discussion: patient. Questions discussed and patient response:  What is your understanding now of where you are with your illness?: pretty good. How much information about what is likely to be ahead with your illness would you like to have?: all. As the clinician I communicated the following to the patient regarding their prognosis: good. If your health situation worsens, what are your most important goals?: get home. What are your biggest fears and worries about the future with your health?: not getting home. Unacceptable function : NA  . What abilities are so critical to your life that you cannot imagine living without them?: NA. Pt does NOT want to: die yet. If you become sicker, how much are you willing to go through for the possibility of gaining more time?: maybe. Would this change if these were permanent states, if they did not get better?: no. How much does your agent and/or family know about your priorities and wishes?:  "everything. Added by VIDHYA Hayes CNP        Allergies and PMH/PSH reviewed in [facility] today.    REVIEW OF SYSTEMS:  4 point ROS including Respiratory, CV, GI and , other than that noted in the HPI,  is negative    Objective:   /88   Pulse 84   Temp 97.5  F (36.4  C)   Resp 18   Ht 1.727 m (5' 8\")   Wt 72.1 kg (158 lb 14.4 oz)   SpO2 95%   BMI 24.16 kg/m    Exam:  GENERAL APPEARANCE:  in no distress, appears healthy  ENT:  Mouth and posterior oropharynx normal, moist mucous membranes  RESP:  lungs clear to auscultation   CV:  no edema, rate-normal  ABDOMEN:  bowel sounds normal  M/S:   Gait and station abnormal transfer assist  SKIN:  Inspection of skin and subcutaneous tissue baseline  NEURO:   Examination of sensation by touch normal  PSYCH:  oriented X 3, memory impaired     Labs:   Labs done in SNF are in [facility]. Please refer to them using [employer]/Care Everywhere.    Assessment/Plan:  (N18.30) Stage 3 chronic kidney disease, unspecified whether stage 3a or 3b CKD (H)  (primary encounter diagnosis)  Comment: labs 8/29 creat 1.65, GFR 40  Plan: dose medications renally as possible  -push fluids  -repeat BMP periodically    (A04.72) Colitis due to Clostridium difficile  Comment: last dose vanco 8/25, on 8/30 one episode diarrrhea  Plan: recheck Cdiff test  -if Cdiff positive then consider lifetime vanco, tolerates well, will give 10 day push then continue daily    (Z97.8) Foster catheter present  Comment: urine retention, lifetime  Plan: change foster Q month  -follow up urology PRN    (Z71.89) ACP (advance care planning)  Comment: POLST completed, full code  Plan: DC ADVANCE CARE PLANNING FIRST 30 MINS          I spent 17 minutes f2f with patient today in addition to todays visit completing a POLST.        Electronically signed by: VIDHYA Hayes CNP  "

## 2022-08-30 NOTE — LETTER
8/30/2022        RE: Reuben Dowell  [address]  [facility] [address]        [facility]  [place] Medical Record Number: [id number]  Chief Complaint   Patient presents with     RECHECK     HPI: Reuben Dowell is a 87 year old (1935), who is being seen today for an episodic care visit at: [facility] AT [facility] ([facility]) [[id number]]. Today's concern is:   1. Stage 3 chronic kidney disease, unspecified whether stage 3a or 3b CKD (H)    2. Colitis due to Clostridium difficile    3. Foster catheter present    4. ACP (advance care planning)      Patient seen for follow up, labs on 8/29 with creat elevated at 1.65, GFR 40, previous GFR was 45, limited intake, diarrhea had resolved when on vanco through 8/25 and now has had a single loose stool, unsure if Cdiff recurring or not, foster output with lifetime catheter WNL, overall appears healthy but not thriving and makes excuses to not participate with therapy, overall no distress.    Advance Care Planning Goals of Care Discussion 8/30/2022  Goals of care discussed with Reuben Dowell on 8/30. Present at discussion: patient. Questions discussed and patient response:  What is your understanding now of where you are with your illness?: pretty good. How much information about what is likely to be ahead with your illness would you like to have?: all. As the clinician I communicated the following to the patient regarding their prognosis: good. If your health situation worsens, what are your most important goals?: get home. What are your biggest fears and worries about the future with your health?: not getting home. Unacceptable function : NA  . What abilities are so critical to your life that you cannot imagine living without them?: NA. Pt does NOT want to: die yet. If you become sicker, how much are you willing to go through for the possibility of gaining more time?: maybe. Would this change if these were permanent states, if they did not get  "better?: no. How much does your agent and/or family know about your priorities and wishes?: everything. Added by VIDHYA Hayes CNP        Allergies and PMH/PSH reviewed in EPIC today.    REVIEW OF SYSTEMS:  4 point ROS including Respiratory, CV, GI and , other than that noted in the HPI,  is negative    Objective:   /88   Pulse 84   Temp 97.5  F (36.4  C)   Resp 18   Ht 1.727 m (5' 8\")   Wt 72.1 kg (158 lb 14.4 oz)   SpO2 95%   BMI 24.16 kg/m    Exam:  GENERAL APPEARANCE:  in no distress, appears healthy  ENT:  Mouth and posterior oropharynx normal, moist mucous membranes  RESP:  lungs clear to auscultation   CV:  no edema, rate-normal  ABDOMEN:  bowel sounds normal  M/S:   Gait and station abnormal transfer assist  SKIN:  Inspection of skin and subcutaneous tissue baseline  NEURO:   Examination of sensation by touch normal  PSYCH:  oriented X 3, memory impaired     Labs:   [facility]. Please refer to them using [facility]/Care Everywhere.    Assessment/Plan:  (N18.30) Stage 3 chronic kidney disease, unspecified whether stage 3a or 3b CKD (H)  (primary encounter diagnosis)  Comment: labs 8/29 creat 1.65, GFR 40  Plan: dose medications renally as possible  -push fluids  -repeat BMP periodically    (A04.72) Colitis due to Clostridium difficile  Comment: last dose vanco 8/25, on 8/30 one episode diarrrhea  Plan: recheck Cdiff test  -if Cdiff positive then consider lifetime vanco, tolerates well, will give 10 day push then continue daily    (Z97.8) Foster catheter present  Comment: urine retention, lifetime  Plan: change foster Q month  -follow up urology PRN    (Z71.89) ACP (advance care planning)  Comment: POLST completed, full code  Plan: [place] ADVANCE CARE PLANNING FIRST 30 MINS          I spent 17 minutes f2f with patient today in addition to todays visit completing a POLST.        Electronically signed by: VIDHYA Hayes " CNP        Sincerely,        VIDHYA Hayes CNP

## 2022-08-31 LAB — HBA1C MFR BLD: 7.6 %

## 2022-09-02 ENCOUNTER — DISCHARGE SUMMARY NURSING HOME (OUTPATIENT)
Dept: GERIATRICS | Facility: CLINIC | Age: 87
End: 2022-09-02
Payer: COMMERCIAL

## 2022-09-02 VITALS
HEART RATE: 69 BPM | DIASTOLIC BLOOD PRESSURE: 84 MMHG | WEIGHT: 158.9 LBS | RESPIRATION RATE: 18 BRPM | HEIGHT: 68 IN | BODY MASS INDEX: 24.08 KG/M2 | TEMPERATURE: 97.3 F | OXYGEN SATURATION: 97 % | SYSTOLIC BLOOD PRESSURE: 146 MMHG

## 2022-09-02 DIAGNOSIS — A04.72 COLITIS DUE TO CLOSTRIDIUM DIFFICILE: ICD-10-CM

## 2022-09-02 DIAGNOSIS — I48.20 CHRONIC ATRIAL FIBRILLATION (H): ICD-10-CM

## 2022-09-02 DIAGNOSIS — N18.32 TYPE 2 DIABETES MELLITUS WITH STAGE 3B CHRONIC KIDNEY DISEASE, WITHOUT LONG-TERM CURRENT USE OF INSULIN (H): ICD-10-CM

## 2022-09-02 DIAGNOSIS — E11.22 TYPE 2 DIABETES MELLITUS WITH STAGE 3B CHRONIC KIDNEY DISEASE, WITHOUT LONG-TERM CURRENT USE OF INSULIN (H): ICD-10-CM

## 2022-09-02 DIAGNOSIS — Z97.8 FOLEY CATHETER PRESENT: ICD-10-CM

## 2022-09-02 DIAGNOSIS — N39.0 URINARY TRACT INFECTION ASSOCIATED WITH INDWELLING URETHRAL CATHETER, SUBSEQUENT ENCOUNTER: ICD-10-CM

## 2022-09-02 DIAGNOSIS — A41.9 SEPSIS DUE TO URINARY TRACT INFECTION (H): Primary | ICD-10-CM

## 2022-09-02 DIAGNOSIS — N39.0 SEPSIS DUE TO URINARY TRACT INFECTION (H): Primary | ICD-10-CM

## 2022-09-02 DIAGNOSIS — T83.511D URINARY TRACT INFECTION ASSOCIATED WITH INDWELLING URETHRAL CATHETER, SUBSEQUENT ENCOUNTER: ICD-10-CM

## 2022-09-02 PROCEDURE — 99316 NF DSCHRG MGMT 30 MIN+: CPT | Performed by: NURSE PRACTITIONER

## 2022-09-02 NOTE — LETTER
9/2/2022        RE: Reuben Dowell  [address]  [facility] [address]        [facility] GERIATRICS DISCHARGE SUMMARY  PATIENT'S NAME: Reuben Dowell  YOB: 1935  MEDICAL RECORD NUMBER:  [id number]  Place of Service where encounter took place:  [facility] ([facility]) [[id number]]    PRIMARY CARE PROVIDER AND CLINIC RESPONSIBLE AFTER TRANSFER:   [facility], [address] / [facility] [address]    Non-FMG Provider     Transferring providers: Jesse Cutler, VIDHYA CNP,   Recent Hospitalization/ED:  [facility] stay 8/14/22 to 8/22/22.  Date of SNF Admission: August 22, 2022  Date of SNF (anticipated) Discharge: September 04, 2022  Discharged to: previous independent home  Cognitive Scores: NA  Physical Function: Pivot transfers  DME: Walker    CODE STATUS/ADVANCE DIRECTIVES DISCUSSION:  No Order   ALLERGIES: Codeine and Hydrocodone-acetaminophen    NURSING FACILITY COURSE   Medication Changes/Rationale:     See notes    Summary of nursing facility stay:      Sepsis due to urinary tract infection (H)  Urinary tract infection associated with indwelling urethral catheter, subsequent encounter  Foster catheter present  Colitis due to Clostridium difficile  Type 2 diabetes mellitus with stage 3b chronic kidney disease, without long-term current use of insulin (H)  Chronic atrial fibrillation (H)        (A41.9,  N39.0) Sepsis due to urinary tract infection (H)  (primary encounter diagnosis)  (R33.9) Urine retention  (T83.511D,  N39.0) Urinary tract infection associated with indwelling urethral catheter, subsequent encounter  (Z97.8) Foster catheter present  Comment: WBC was 22.4, treated with levofloxacin, foster changed  Plan:   -PT/OT eval and treat  -CBC, BMP, A1C on 8/29, results creat 1.65, GFR 40, WBC 9.5, Hgb 14  -continue oral vanco through 8/25 then discontinue  -follow up Cdiff testing only if  loose  -change foster monthly        (A04.72) Colitis due to Clostridium difficile  Comment: formed stool in hospital  Plan: follow up Cdiff testing PRN     (E11.22,  N18.32) Type 2 diabetes mellitus with stage 3b chronic kidney disease, without long-term current use of insulin (H)  Comment: recent A1C was 7.3  Plan: follow up A1C on 8/29 was 7.6     (I48.20) Chronic atrial fibrillation (H)  Comment: today RRR, denies CP  Plan: continue statin, ASA81  -follow up cardiology PRN    Discharge Medications:    Current Outpatient Medications   Medication Sig Dispense Refill     acetaminophen (TYLENOL) 500 MG tablet Take 1,000 mg by mouth 3 times daily as needed       amLODIPine (NORVASC) 2.5 MG tablet Take 2.5 mg by mouth daily       aspirin 81 MG EC tablet Take 81 mg by mouth daily       atorvastatin (LIPITOR) 40 MG tablet Take 40 mg by mouth daily       calcium carbonate 600 mg-vitamin D 400 units (CALTRATE) 600-400 MG-UNIT per tablet Take 1 tablet by mouth 2 times daily       insulin aspart (NOVOLOG FLEXPEN) 100 UNIT/ML pen Inject Subcutaneous At Bedtime Per Sliding Scale;  If Blood Sugar is 120 to 149, give 0 Units.  If Blood Sugar is 150 to 199, give 2 Units.  If Blood Sugar is 200 to 249, give 3 Units.  If Blood Sugar is 250 to 299, give 5 Units.  If Blood Sugar is 300 to 349, give 6 Units.  If Blood Sugar is 350 to 351, give 8 Units.  If Blood Sugar is greater than 351, call MD.  subcutaneous  Special Instructions: If BS Greater than 350, give insulin as directed. Recheck BS in 2 hours. If BS still greater than 350, contact doctor  At Bedtime       insulin aspart (NOVOLOG FLEXPEN) 100 UNIT/ML pen Inject Subcutaneous 3 times daily (with meals) Sliding Scale;  If Blood Sugar is 120 to 149, give 2 Units.  If Blood Sugar is 150 to 199, give 3 Units.  If Blood Sugar is 200 to 249, give 6 Units.  If Blood Sugar is 250 to 299, give 9 Units.  If Blood Sugar is 300 to 349, give 12 Units.  If Blood Sugar is 350 to 351, give  "15 Units.  If Blood Sugar is greater than 351, call MD.  subcutaneous  Special Instructions: with meals If BS greater than 350, give insulin as directed. Recheck BS in 2 hours. If BS still higher than 350, contact your doctor. BS less than 120       metoprolol tartrate (LOPRESSOR) 50 MG tablet Take 50 mg by mouth 2 times daily        Controlled medications:   not applicable/none     Past Medical History: No past medical history on file.  Physical Exam:   Vitals: BP (!) 146/84   Pulse 69   Temp 97.3  F (36.3  C)   Resp 18   Ht 1.727 m (5' 8\")   Wt 72.1 kg (158 lb 14.4 oz)   SpO2 97%   BMI 24.16 kg/m    BMI: Body mass index is 24.16 kg/m .  GENERAL APPEARANCE:  in no distress, appears healthy  ENT:  Mouth and posterior oropharynx normal, moist mucous membranes  RESP:  lungs clear to auscultation   CV:  no edema, rate-normal  ABDOMEN:  bowel sounds normal  M/S:   Gait and station abnormal unsteady gait  SKIN:  Inspection of skin and subcutaneous tissue baseline  NEURO:   Examination of sensation by touch normal  PSYCH:  oriented X 3     SNF labs: Labs done in SNF are in [facility]. Please refer to them using [employer]/Care Everywhere.    DISCHARGE PLAN:    Follow up labs: No labs orders/due    Medical Follow Up:      Follow up with primary care provider in 1 weeks    Current [place] scheduled appointments:   NA    Discharge Services: Home Care:  Occupational Therapy, Physical Therapy, Registered Nurse, Home Health Aide and     Discharge Instructions Verbalized to Patient at Discharge:     None    TOTAL DISCHARGE TIME:   Greater than 30 minutes  Electronically signed by:  VIDHYA Hayes CNP         Documentation of Face-to-Face and Certification for Home Health Services     Patient: Reuben Dowell   YOB: 1935  MR Number: [id number]  Today's Date: 9/2/2022    I certify that patient: Reuben Dowell is under my care and that I, or a nurse practitioner or physician's " assistant working with me, had a face-to-face encounter that meets the physician face-to-face encounter requirements with this patient on: 9/2/2022.    This encounter with the patient was in whole, or in part, for the following medical condition, which is the primary reason for home health care:   1. Sepsis due to urinary tract infection (H)    2. Urinary tract infection associated with indwelling urethral catheter, subsequent encounter    3. Avery catheter present    4. Colitis due to Clostridium difficile    5. Type 2 diabetes mellitus with stage 3b chronic kidney disease, without long-term current use of insulin (H)    6. Chronic atrial fibrillation (H)        I certify that, based on my findings, the following services are medically necessary home health services: Nursing, Occupational Therapy, Physical Therapy, Social Work and HHA.    My clinical findings support the need for the above services because: Nurse is needed: To provide caregiver training to assist with: med regimen, education.., Occupational Therapy Services are needed to assess and treat cognitive ability and address ADL safety due to impairment in transfers, DME., Physical Therapy Services are needed to assess and treat the following functional impairments: gait instability. and HHA for bathing, SW for community resources    Further, I certify that my clinical findings support that this patient is homebound (i.e. absences from home require considerable and taxing effort and are for medical reasons or Rastafari services or infrequently or of short duration when for other reasons) because: Requires assistance of another person or specialized equipment to access medical services because patient: Is unable to operate assistive equipment on their own...    Based on the above findings. I certify that this patient is confined to the home and needs intermittent skilled nursing care, physical therapy and/or speech therapy.  The patient is under my care,  and I have initiated the establishment of the plan of care.  This patient will be followed by a physician who will periodically review the plan of care.  Physician/Provider to provide follow up care: [facility], [facility] Responsible Medicare certified PECOS Physician: Jesse Cutler NP  Electronic Physician Signature  Date: 9/2/2022                    Sincerely,        VIDHYA Hayes CNP

## 2022-09-02 NOTE — PROGRESS NOTES
[facility] GERIATRICS DISCHARGE SUMMARY  PATIENT'S NAME: Reuben Dowell  YOB: 1935  MEDICAL RECORD NUMBER:  [id number]  Place of Service where encounter took place:  [facility] ([facility]) [[id number]]    PRIMARY CARE PROVIDER AND CLINIC RESPONSIBLE AFTER TRANSFER:   [facility], [address] / [facility] [address]    Non-FMG Provider     Transferring providers: Jesse Cutler, VIDHYA CNP,   Recent Hospitalization/ED:  [facility] stay 8/14/22 to 8/22/22.  Date of SNF Admission: August 22, 2022  Date of SNF (anticipated) Discharge: September 04, 2022  Discharged to: previous independent home  Cognitive Scores: NA  Physical Function: Pivot transfers  DME: Walker    CODE STATUS/ADVANCE DIRECTIVES DISCUSSION:  No Order   ALLERGIES: Codeine and Hydrocodone-acetaminophen    NURSING FACILITY COURSE   Medication Changes/Rationale:     See notes    Summary of nursing facility stay:      Sepsis due to urinary tract infection (H)  Urinary tract infection associated with indwelling urethral catheter, subsequent encounter  Foster catheter present  Colitis due to Clostridium difficile  Type 2 diabetes mellitus with stage 3b chronic kidney disease, without long-term current use of insulin (H)  Chronic atrial fibrillation (H)        (A41.9,  N39.0) Sepsis due to urinary tract infection (H)  (primary encounter diagnosis)  (R33.9) Urine retention  (T83.511D,  N39.0) Urinary tract infection associated with indwelling urethral catheter, subsequent encounter  (Z97.8) Foster catheter present  Comment: WBC was 22.4, treated with levofloxacin, foster changed  Plan:   -PT/OT eval and treat  -CBC, BMP, A1C on 8/29, results creat 1.65, GFR 40, WBC 9.5, Hgb 14  -continue oral vanco through 8/25 then discontinue  -follow up Cdiff testing only if loose  -change foster monthly        (A04.72) Colitis due to Clostridium difficile  Comment: formed  stool in hospital  Plan: follow up Cdiff testing PRN     (E11.22,  N18.32) Type 2 diabetes mellitus with stage 3b chronic kidney disease, without long-term current use of insulin (H)  Comment: recent A1C was 7.3  Plan: follow up A1C on 8/29 was 7.6     (I48.20) Chronic atrial fibrillation (H)  Comment: today RRR, denies CP  Plan: continue statin, ASA81  -follow up cardiology PRN    Discharge Medications:    Current Outpatient Medications   Medication Sig Dispense Refill     acetaminophen (TYLENOL) 500 MG tablet Take 1,000 mg by mouth 3 times daily as needed       amLODIPine (NORVASC) 2.5 MG tablet Take 2.5 mg by mouth daily       aspirin 81 MG EC tablet Take 81 mg by mouth daily       atorvastatin (LIPITOR) 40 MG tablet Take 40 mg by mouth daily       calcium carbonate 600 mg-vitamin D 400 units (CALTRATE) 600-400 MG-UNIT per tablet Take 1 tablet by mouth 2 times daily       insulin aspart (NOVOLOG FLEXPEN) 100 UNIT/ML pen Inject Subcutaneous At Bedtime Per Sliding Scale;  If Blood Sugar is 120 to 149, give 0 Units.  If Blood Sugar is 150 to 199, give 2 Units.  If Blood Sugar is 200 to 249, give 3 Units.  If Blood Sugar is 250 to 299, give 5 Units.  If Blood Sugar is 300 to 349, give 6 Units.  If Blood Sugar is 350 to 351, give 8 Units.  If Blood Sugar is greater than 351, call MD.  subcutaneous  Special Instructions: If BS Greater than 350, give insulin as directed. Recheck BS in 2 hours. If BS still greater than 350, contact doctor  At Bedtime       insulin aspart (NOVOLOG FLEXPEN) 100 UNIT/ML pen Inject Subcutaneous 3 times daily (with meals) Sliding Scale;  If Blood Sugar is 120 to 149, give 2 Units.  If Blood Sugar is 150 to 199, give 3 Units.  If Blood Sugar is 200 to 249, give 6 Units.  If Blood Sugar is 250 to 299, give 9 Units.  If Blood Sugar is 300 to 349, give 12 Units.  If Blood Sugar is 350 to 351, give 15 Units.  If Blood Sugar is greater than 351, call MD.  subcutaneous  Special Instructions: with  "meals If BS greater than 350, give insulin as directed. Recheck BS in 2 hours. If BS still higher than 350, contact your doctor. BS less than 120       metoprolol tartrate (LOPRESSOR) 50 MG tablet Take 50 mg by mouth 2 times daily        Controlled medications:   not applicable/none     Past Medical History: No past medical history on file.  Physical Exam:   Vitals: BP (!) 146/84   Pulse 69   Temp 97.3  F (36.3  C)   Resp 18   Ht 1.727 m (5' 8\")   Wt 72.1 kg (158 lb 14.4 oz)   SpO2 97%   BMI 24.16 kg/m    BMI: Body mass index is 24.16 kg/m .  GENERAL APPEARANCE:  in no distress, appears healthy  ENT:  Mouth and posterior oropharynx normal, moist mucous membranes  RESP:  lungs clear to auscultation   CV:  no edema, rate-normal  ABDOMEN:  bowel sounds normal  M/S:   Gait and station abnormal unsteady gait  SKIN:  Inspection of skin and subcutaneous tissue baseline  NEURO:   Examination of sensation by touch normal  PSYCH:  oriented X 3     SNF labs: Labs done in SNF are in [facility]. Please refer to them using [facility]/Care Everywhere.    DISCHARGE PLAN:    Follow up labs: No labs orders/due    Medical Follow Up:      Follow up with primary care provider in 1 weeks    Current [place] scheduled appointments:   NA    Discharge Services: Home Care:  Occupational Therapy, Physical Therapy, Registered Nurse, Home Health Aide and     Discharge Instructions Verbalized to Patient at Discharge:     None    TOTAL DISCHARGE TIME:   Greater than 30 minutes  Electronically signed by:  VIDHYA Hayes CNP         Documentation of Face-to-Face and Certification for Home Health Services     Patient: Reuben Dowell   YOB: 1935  MR Number: [id number]  Today's Date: 9/2/2022    I certify that patient: Reuben Dowell is under my care and that I, or a nurse practitioner or physician's assistant working with me, had a face-to-face encounter that meets the physician face-to-face encounter " requirements with this patient on: 9/2/2022.    This encounter with the patient was in whole, or in part, for the following medical condition, which is the primary reason for home health care:   1. Sepsis due to urinary tract infection (H)    2. Urinary tract infection associated with indwelling urethral catheter, subsequent encounter    3. Avery catheter present    4. Colitis due to Clostridium difficile    5. Type 2 diabetes mellitus with stage 3b chronic kidney disease, without long-term current use of insulin (H)    6. Chronic atrial fibrillation (H)        I certify that, based on my findings, the following services are medically necessary home health services: Nursing, Occupational Therapy, Physical Therapy, Social Work and HHA.    My clinical findings support the need for the above services because: Nurse is needed: To provide caregiver training to assist with: med regimen, education.., Occupational Therapy Services are needed to assess and treat cognitive ability and address ADL safety due to impairment in transfers, DME., Physical Therapy Services are needed to assess and treat the following functional impairments: gait instability. and HHA for bathing, SW for community resources    Further, I certify that my clinical findings support that this patient is homebound (i.e. absences from home require considerable and taxing effort and are for medical reasons or Protestant services or infrequently or of short duration when for other reasons) because: Requires assistance of another person or specialized equipment to access medical services because patient: Is unable to operate assistive equipment on their own...    Based on the above findings. I certify that this patient is confined to the home and needs intermittent skilled nursing care, physical therapy and/or speech therapy.  The patient is under my care, and I have initiated the establishment of the plan of care.  This patient will be followed by a physician  who will periodically review the plan of care.  Physician/Provider to provide follow up care: [place], [facility]    Responsible Medicare certified PECOS Physician: Jesse Cutler NP  Electronic Physician Signature  Date: 9/2/2022               No

## 2022-09-03 ENCOUNTER — LAB REQUISITION (OUTPATIENT)
Dept: LAB | Facility: CLINIC | Age: 87
End: 2022-09-03
Payer: COMMERCIAL

## 2022-09-03 ENCOUNTER — TELEPHONE (OUTPATIENT)
Dept: GERIATRICS | Facility: CLINIC | Age: 87
End: 2022-09-03

## 2022-09-03 DIAGNOSIS — A04.72 COLITIS DUE TO CLOSTRIDIUM DIFFICILE: Primary | ICD-10-CM

## 2022-09-03 DIAGNOSIS — R19.7 DIARRHEA, UNSPECIFIED: ICD-10-CM

## 2022-09-03 LAB — C DIFF TOX B STL QL: POSITIVE

## 2022-09-03 PROCEDURE — 87493 C DIFF AMPLIFIED PROBE: CPT | Mod: ORL | Performed by: NURSE PRACTITIONER

## 2022-09-03 RX ORDER — VANCOMYCIN HYDROCHLORIDE 125 MG/1
125 CAPSULE ORAL DAILY
Qty: 30 CAPSULE | Refills: 0 | Status: SHIPPED | OUTPATIENT
Start: 2022-09-14

## 2022-09-03 RX ORDER — VANCOMYCIN HYDROCHLORIDE 125 MG/1
125 CAPSULE ORAL 4 TIMES DAILY
Qty: 40 CAPSULE | Refills: 0 | Status: SHIPPED | OUTPATIENT
Start: 2022-09-03 | End: 2022-09-13

## 2022-09-04 NOTE — TELEPHONE ENCOUNTER
[place] GERIATRIC SERVICES TELEPHONE ENCOUNTER    Chief Complaint   Patient presents with     Diarrhea       Reuben Dowell is a 87 year old  (1935),Nurse called today to report: Stool came back positive with c diff per Registered Nurse. Per on site provider documentation-if Cdiff positive then consider lifetime vanco, tolerates well, will give 10 day push then continue daily.     ASSESSMENT/PLAN      Restart vancomycin 125mg QID x 10 days, after 10 days continue 125mg daily thereafter    Follow up with PCP within 2 weeks post discharge tomorrow.     Electronically signed by:   VIDHYA Lopez CNP